# Patient Record
Sex: FEMALE | NOT HISPANIC OR LATINO
[De-identification: names, ages, dates, MRNs, and addresses within clinical notes are randomized per-mention and may not be internally consistent; named-entity substitution may affect disease eponyms.]

---

## 2017-05-08 ENCOUNTER — APPOINTMENT (OUTPATIENT)
Dept: HEMATOLOGY ONCOLOGY | Facility: CLINIC | Age: 52
End: 2017-05-08

## 2017-05-08 VITALS
HEART RATE: 60 BPM | TEMPERATURE: 98.7 F | BODY MASS INDEX: 27.47 KG/M2 | DIASTOLIC BLOOD PRESSURE: 60 MMHG | SYSTOLIC BLOOD PRESSURE: 100 MMHG | WEIGHT: 186 LBS

## 2017-05-08 RX ORDER — MULTIVIT-MIN/FOLIC/VIT K/LYCOP 400-300MCG
25 MCG TABLET ORAL
Refills: 0 | Status: ACTIVE | COMMUNITY

## 2017-07-20 ENCOUNTER — RESULT REVIEW (OUTPATIENT)
Age: 52
End: 2017-07-20

## 2017-10-31 ENCOUNTER — RECORD ABSTRACTING (OUTPATIENT)
Age: 52
End: 2017-10-31

## 2017-11-06 ENCOUNTER — APPOINTMENT (OUTPATIENT)
Dept: HEMATOLOGY ONCOLOGY | Facility: CLINIC | Age: 52
End: 2017-11-06
Payer: MEDICAID

## 2017-11-06 VITALS
OXYGEN SATURATION: 97 % | SYSTOLIC BLOOD PRESSURE: 104 MMHG | TEMPERATURE: 98.7 F | WEIGHT: 186 LBS | RESPIRATION RATE: 14 BRPM | HEART RATE: 82 BPM | BODY MASS INDEX: 27.47 KG/M2 | DIASTOLIC BLOOD PRESSURE: 70 MMHG

## 2017-11-06 DIAGNOSIS — Z12.4 ENCOUNTER FOR SCREENING FOR MALIGNANT NEOPLASM OF CERVIX: ICD-10-CM

## 2017-11-06 PROCEDURE — 99214 OFFICE O/P EST MOD 30 MIN: CPT

## 2018-05-10 ENCOUNTER — APPOINTMENT (OUTPATIENT)
Dept: HEMATOLOGY ONCOLOGY | Facility: CLINIC | Age: 53
End: 2018-05-10
Payer: MEDICAID

## 2018-05-10 VITALS
WEIGHT: 184 LBS | TEMPERATURE: 98.6 F | SYSTOLIC BLOOD PRESSURE: 106 MMHG | HEART RATE: 64 BPM | DIASTOLIC BLOOD PRESSURE: 66 MMHG | BODY MASS INDEX: 27.17 KG/M2

## 2018-05-10 PROCEDURE — 99214 OFFICE O/P EST MOD 30 MIN: CPT

## 2018-09-24 ENCOUNTER — APPOINTMENT (OUTPATIENT)
Dept: ORTHOPEDIC SURGERY | Facility: CLINIC | Age: 53
End: 2018-09-24
Payer: MEDICAID

## 2018-09-24 VITALS — BODY MASS INDEX: 25.62 KG/M2 | WEIGHT: 179 LBS | HEIGHT: 70 IN

## 2018-09-24 VITALS — DIASTOLIC BLOOD PRESSURE: 72 MMHG | SYSTOLIC BLOOD PRESSURE: 110 MMHG | HEART RATE: 65 BPM

## 2018-09-24 PROCEDURE — 73130 X-RAY EXAM OF HAND: CPT | Mod: LT

## 2018-09-24 PROCEDURE — 99204 OFFICE O/P NEW MOD 45 MIN: CPT | Mod: 57

## 2018-09-24 PROCEDURE — 26600 TREAT METACARPAL FRACTURE: CPT | Mod: F4

## 2018-09-24 RX ORDER — DIPHENHYDRAMINE HCL 25 MG
TABLET,DISINTEGRATING ORAL
Refills: 0 | Status: ACTIVE | COMMUNITY

## 2018-09-24 RX ORDER — CHOLESTEROL 100 %
POWDER (GRAM) MISCELLANEOUS
Refills: 0 | Status: ACTIVE | COMMUNITY

## 2018-10-07 PROBLEM — S62.327A CLOSED DISPLACED FRACTURE OF SHAFT OF FIFTH METACARPAL BONE OF LEFT HAND, INITIAL ENCOUNTER: Noted: 2018-09-24

## 2018-10-08 ENCOUNTER — APPOINTMENT (OUTPATIENT)
Dept: ORTHOPEDIC SURGERY | Facility: CLINIC | Age: 53
End: 2018-10-08
Payer: MEDICAID

## 2018-10-08 DIAGNOSIS — S62.327A DISPLACED FRACTURE OF SHAFT OF FIFTH METACARPAL BONE, LEFT HAND, INITIAL ENCOUNTER FOR CLOSED FRACTURE: ICD-10-CM

## 2018-10-08 PROCEDURE — 99024 POSTOP FOLLOW-UP VISIT: CPT

## 2018-10-08 PROCEDURE — 73130 X-RAY EXAM OF HAND: CPT | Mod: LT

## 2018-11-12 ENCOUNTER — APPOINTMENT (OUTPATIENT)
Dept: HEMATOLOGY ONCOLOGY | Facility: CLINIC | Age: 53
End: 2018-11-12
Payer: MEDICAID

## 2018-11-12 VITALS — DIASTOLIC BLOOD PRESSURE: 60 MMHG | SYSTOLIC BLOOD PRESSURE: 96 MMHG | HEART RATE: 72 BPM | TEMPERATURE: 98.5 F

## 2018-11-12 VITALS — WEIGHT: 180 LBS | BODY MASS INDEX: 25.83 KG/M2

## 2018-11-12 DIAGNOSIS — S62.327D DISPLACED FRACTURE OF SHAFT OF FIFTH METACARPAL BONE, LEFT HAND, SUBSEQUENT ENCOUNTER FOR FRACTURE WITH ROUTINE HEALING: ICD-10-CM

## 2018-11-12 PROCEDURE — 99214 OFFICE O/P EST MOD 30 MIN: CPT

## 2018-11-12 NOTE — ASSESSMENT
[FreeTextEntry1] : #1) history of breast cancer-clinically SRIRAM. Continue oncologic surveillance. Continues to see breast surgeon Dr. Marcelo biannually.\par #2) history of relative lymphocytosis-CBC reviewed with patient. Hematologically stable in this regard currently. Continue to monitor CBC with differential.\par Patient was given the opportunity to ask questions. Her questions have been answered at this time.\par

## 2018-11-12 NOTE — CONSULT LETTER
[Dear  ___] : Dear  [unfilled], [Courtesy Letter:] : I had the pleasure of seeing your patient, [unfilled], in my office today. [Please see my note below.] : Please see my note below. [Consult Closing:] : Thank you very much for allowing me to participate in the care of this patient.  If you have any questions, please do not hesitate to contact me. [Sincerely,] : Sincerely, [FreeTextEntry3] : Jada Herron M.D.

## 2018-11-12 NOTE — PHYSICAL EXAM
[Fully active, able to carry on all pre-disease performance without restriction] : Status 0 - Fully active, able to carry on all pre-disease performance without restriction [Normal] : affect appropriate [de-identified] : No dominant mass, nipple retraction, or discharge. [de-identified] : no vesicles

## 2018-11-12 NOTE — RESULTS/DATA
[FreeTextEntry1] : 9/2018-mammogram showed no mammographic evidence of malignancy. Breast ultrasound showed no sonographic evidence of malignancy.\par 11/2018-hemoglobin 13.4, hematocrit 40.4, WBC 7.3, with 33% neutrophils, 54% lymphocytes, platelet count 202,000.

## 2018-11-12 NOTE — HISTORY OF PRESENT ILLNESS
[Disease: _____________________] : Disease: [unfilled] [T: ___] : T[unfilled] [N: ___] : N[unfilled] [M: ___] : M[unfilled] [AJCC Stage: ____] : AJCC Stage: [unfilled] [de-identified] : History of right breast cancer 6/2006-Clinically Stage IIB (T2N1M0), ER negative/[place] negative/HER-2 sandro negative. Status post neoadjuvant TAC--> right breast conservation surgery-->RT.\par History of relative increase in lymphocytes , at least dating back to 5/2008.\par 10/2014 Peripheral blood flow cytometry showed no morphologic or immunophenotypic evidence of a lymphoproliferative disorder or presence of blasts.  Smear review showed increase in eosinophils, and borderline increase in lymphocytes, which showed diminished expression of CD 7. These findings could be seen in a T cell lymphoproliferative condition. This may also be seen in inflammatory conditions where the loss is temporary in nature.\par 3/2016-Peripheral blood T cell gene rearrangement study showed a clonal T-cell receptor gamma population detected.\par 3/2016- Abdominal ultrasound-unremarkable size of the liver and spleen.\par 6/2016-T-cell gene clonality study was positive for a clonal T-cell receptor beta population. 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 No GI complaints currently.\par No new breast pain or lumps. No recent infections or fevers. No lymph node complaints.  No c/o abdominal pain.\par

## 2019-05-10 ENCOUNTER — OUTPATIENT (OUTPATIENT)
Dept: OUTPATIENT SERVICES | Facility: HOSPITAL | Age: 54
LOS: 1 days | Discharge: HOME | End: 2019-05-10

## 2019-05-10 DIAGNOSIS — D64.9 ANEMIA, UNSPECIFIED: ICD-10-CM

## 2019-05-13 ENCOUNTER — APPOINTMENT (OUTPATIENT)
Dept: HEMATOLOGY ONCOLOGY | Facility: CLINIC | Age: 54
End: 2019-05-13
Payer: COMMERCIAL

## 2019-05-13 VITALS
TEMPERATURE: 98.3 F | OXYGEN SATURATION: 99 % | BODY MASS INDEX: 26.32 KG/M2 | DIASTOLIC BLOOD PRESSURE: 75 MMHG | SYSTOLIC BLOOD PRESSURE: 116 MMHG | HEART RATE: 68 BPM | WEIGHT: 183.42 LBS | RESPIRATION RATE: 16 BRPM

## 2019-05-13 PROCEDURE — 99214 OFFICE O/P EST MOD 30 MIN: CPT

## 2019-05-13 NOTE — PHYSICAL EXAM
[Fully active, able to carry on all pre-disease performance without restriction] : Status 0 - Fully active, able to carry on all pre-disease performance without restriction [Normal] : affect appropriate [de-identified] : no vesicles [de-identified] : No dominant mass, nipple retraction, or discharge.

## 2019-05-13 NOTE — HISTORY OF PRESENT ILLNESS
[T: ___] : T[unfilled] [Disease: _____________________] : Disease: [unfilled] [N: ___] : N[unfilled] [AJCC Stage: ____] : AJCC Stage: [unfilled] [M: ___] : M[unfilled] [de-identified] : History of right breast cancer 6/2006-Clinically Stage IIB (T2N1M0), ER negative/[place] negative/HER-2 sandro negative. Status post neoadjuvant TAC--> right breast conservation surgery-->RT.\par History of relative increase in lymphocytes , at least dating back to 5/2008.\par 10/2014 Peripheral blood flow cytometry showed no morphologic or immunophenotypic evidence of a lymphoproliferative disorder or presence of blasts.  Smear review showed increase in eosinophils, and borderline increase in lymphocytes, which showed diminished expression of CD 7. These findings could be seen in a T cell lymphoproliferative condition. This may also be seen in inflammatory conditions where the loss is temporary in nature.\par 3/2016-Peripheral blood T cell gene rearrangement study showed a clonal T-cell receptor gamma population detected.\par 3/2016- Abdominal ultrasound-unremarkable size of the liver and spleen.\par 6/2016-T-cell gene clonality study was positive for a clonal T-cell receptor beta population. 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 [de-identified] : Overall, feeling well, though does have some issues with sinuses/allergies. Plans to see urologist for recurrent microscopic hematuria. No c/o dysuria, fevers, pain.\par Had insufficient prep for last colonoscopy-has to reschedule it for 10/2019. No GI complaints currently.\par No new breast pain or lumps. No lymph node complaints. Sees breast surgeon every 6 months and has breast US done.\par

## 2019-05-13 NOTE — ASSESSMENT
[FreeTextEntry1] : #1) history of breast cancer-clinically SRIRAM. Continue oncologic surveillance. Patient will have her annual mammogram/breast ultrasound as planned (states she gets prescriptions for breast imaging from Dr. Marcelo).\par #2) history of relative lymphocytosis/T-cell clonal population of undetermined significance. Hematologically stable in this regard currently. Continue to follow CBC with differential. \par Patient's questions have been answered to her apparent satisfaction at this time.

## 2019-05-13 NOTE — CONSULT LETTER
[Dear  ___] : Dear  [unfilled], [Please see my note below.] : Please see my note below. [Courtesy Letter:] : I had the pleasure of seeing your patient, [unfilled], in my office today. [Consult Closing:] : Thank you very much for allowing me to participate in the care of this patient.  If you have any questions, please do not hesitate to contact me. [Sincerely,] : Sincerely, [DrWesley  ___] : Dr. GUAN [FreeTextEntry3] : Jada Herron M.D.

## 2019-05-13 NOTE — RESULTS/DATA
[FreeTextEntry1] : Hemoglobin 13.4, hematocrit 39.2, WBC 8.6, with 32% neutrophils, 53% lymphocytes, 9% eosinophils, platelet count 237,000.
Yes

## 2019-06-11 ENCOUNTER — APPOINTMENT (OUTPATIENT)
Dept: UROLOGY | Facility: CLINIC | Age: 54
End: 2019-06-11
Payer: COMMERCIAL

## 2019-06-11 VITALS
SYSTOLIC BLOOD PRESSURE: 119 MMHG | RESPIRATION RATE: 16 BRPM | BODY MASS INDEX: 26.48 KG/M2 | TEMPERATURE: 98.4 F | HEART RATE: 62 BPM | DIASTOLIC BLOOD PRESSURE: 71 MMHG | WEIGHT: 185 LBS | HEIGHT: 70 IN

## 2019-06-11 DIAGNOSIS — Z78.9 OTHER SPECIFIED HEALTH STATUS: ICD-10-CM

## 2019-06-11 DIAGNOSIS — R73.03 PREDIABETES.: ICD-10-CM

## 2019-06-11 DIAGNOSIS — Z85.3 PERSONAL HISTORY OF MALIGNANT NEOPLASM OF BREAST: ICD-10-CM

## 2019-06-11 DIAGNOSIS — Z80.42 FAMILY HISTORY OF MALIGNANT NEOPLASM OF PROSTATE: ICD-10-CM

## 2019-06-11 DIAGNOSIS — Z86.39 PERSONAL HISTORY OF OTHER ENDOCRINE, NUTRITIONAL AND METABOLIC DISEASE: ICD-10-CM

## 2019-06-11 DIAGNOSIS — Z80.3 FAMILY HISTORY OF MALIGNANT NEOPLASM OF BREAST: ICD-10-CM

## 2019-06-11 DIAGNOSIS — R31.29 OTHER MICROSCOPIC HEMATURIA: ICD-10-CM

## 2019-06-11 PROCEDURE — 99203 OFFICE O/P NEW LOW 30 MIN: CPT

## 2019-06-11 RX ORDER — CALCIUM CITRATE/VITAMIN D3 315MG-6.25
TABLET ORAL
Refills: 0 | Status: ACTIVE | COMMUNITY

## 2019-06-11 RX ORDER — SIMVASTATIN 80 MG/1
TABLET, FILM COATED ORAL
Refills: 0 | Status: ACTIVE | COMMUNITY

## 2019-06-11 NOTE — REVIEW OF SYSTEMS
[Eyesight Problems] : eyesight problems [Date of last menstrual period ____] : date of last menstrual period: [unfilled] [Told you have blood in urine on a urine test] : told blood was present in a urine test [Negative] : Endocrine

## 2019-06-11 NOTE — PHYSICAL EXAM
[General Appearance - Well Developed] : well developed [General Appearance - Well Nourished] : well nourished [General Appearance - In No Acute Distress] : no acute distress [Well Groomed] : well groomed [Normal Appearance] : normal appearance [Edema] : no peripheral edema [Respiration, Rhythm And Depth] : normal respiratory rhythm and effort [] : no respiratory distress [Exaggerated Use Of Accessory Muscles For Inspiration] : no accessory muscle use [Costovertebral Angle Tenderness] : no ~M costovertebral angle tenderness [Abdomen Tenderness] : non-tender [Abdomen Soft] : soft [Urinary Bladder Findings] : the bladder was normal on palpation

## 2019-06-12 ENCOUNTER — OTHER (OUTPATIENT)
Age: 54
End: 2019-06-12

## 2019-06-12 LAB
APPEARANCE: CLEAR
BACTERIA: NEGATIVE
BILIRUBIN URINE: NEGATIVE
BLOOD URINE: NEGATIVE
COLOR: NORMAL
GLUCOSE QUALITATIVE U: NEGATIVE
HYALINE CASTS: 0 /LPF
KETONES URINE: NEGATIVE
LEUKOCYTE ESTERASE URINE: NEGATIVE
MICROSCOPIC-UA: NORMAL
NITRITE URINE: NEGATIVE
PH URINE: 7.5
PROTEIN URINE: NEGATIVE
RED BLOOD CELLS URINE: 4 /HPF
SPECIFIC GRAVITY URINE: 1.01
SQUAMOUS EPITHELIAL CELLS: 0 /HPF
URINE CYTOLOGY: NORMAL
UROBILINOGEN URINE: NORMAL
WHITE BLOOD CELLS URINE: 1 /HPF

## 2019-06-13 ENCOUNTER — FORM ENCOUNTER (OUTPATIENT)
Age: 54
End: 2019-06-13

## 2019-06-14 ENCOUNTER — APPOINTMENT (OUTPATIENT)
Dept: CT IMAGING | Facility: CLINIC | Age: 54
End: 2019-06-14
Payer: COMMERCIAL

## 2019-06-14 ENCOUNTER — OUTPATIENT (OUTPATIENT)
Dept: OUTPATIENT SERVICES | Facility: HOSPITAL | Age: 54
LOS: 1 days | End: 2019-06-14
Payer: COMMERCIAL

## 2019-06-14 DIAGNOSIS — R31.29 OTHER MICROSCOPIC HEMATURIA: ICD-10-CM

## 2019-06-14 DIAGNOSIS — Z00.8 ENCOUNTER FOR OTHER GENERAL EXAMINATION: ICD-10-CM

## 2019-06-14 PROCEDURE — 74178 CT ABD&PLV WO CNTR FLWD CNTR: CPT | Mod: 26

## 2019-06-14 PROCEDURE — 74178 CT ABD&PLV WO CNTR FLWD CNTR: CPT

## 2019-06-26 NOTE — HISTORY OF PRESENT ILLNESS
[FreeTextEntry1] : Long standing hx of microscopic hematuria.\par Has not had formal workup in the past.  No associated urinary symptoms.\par No flank pain.  No abdominal pain.  No dysuria, gross hematuria.\par No prior  surgery or h/o kidney stones.\par

## 2019-11-15 ENCOUNTER — OUTPATIENT (OUTPATIENT)
Dept: OUTPATIENT SERVICES | Facility: HOSPITAL | Age: 54
LOS: 1 days | Discharge: HOME | End: 2019-11-15

## 2019-11-15 DIAGNOSIS — D64.9 ANEMIA, UNSPECIFIED: ICD-10-CM

## 2019-11-18 ENCOUNTER — RESULT REVIEW (OUTPATIENT)
Age: 54
End: 2019-11-18

## 2019-11-18 ENCOUNTER — APPOINTMENT (OUTPATIENT)
Dept: HEMATOLOGY ONCOLOGY | Facility: CLINIC | Age: 54
End: 2019-11-18
Payer: COMMERCIAL

## 2019-11-18 VITALS
DIASTOLIC BLOOD PRESSURE: 68 MMHG | OXYGEN SATURATION: 100 % | HEART RATE: 67 BPM | HEIGHT: 68.5 IN | TEMPERATURE: 98.2 F | BODY MASS INDEX: 26.98 KG/M2 | WEIGHT: 180.1 LBS | SYSTOLIC BLOOD PRESSURE: 108 MMHG | RESPIRATION RATE: 16 BRPM

## 2019-11-18 LAB
BASOPHILS # BLD AUTO: 0.1 K/UL — SIGNIFICANT CHANGE UP (ref 0–0.2)
BASOPHILS NFR BLD AUTO: 1 % — SIGNIFICANT CHANGE UP (ref 0–2)
EOSINOPHIL # BLD AUTO: 1.1 K/UL — HIGH (ref 0–0.5)
EOSINOPHIL NFR BLD AUTO: 10.2 % — HIGH (ref 0–6)
HCT VFR BLD CALC: 39.1 % — SIGNIFICANT CHANGE UP (ref 34.5–45)
HGB BLD-MCNC: 13 G/DL — SIGNIFICANT CHANGE UP (ref 11.5–15.5)
LYMPHOCYTES # BLD AUTO: 4.8 K/UL — HIGH (ref 1–3.3)
LYMPHOCYTES # BLD AUTO: 43.6 % — SIGNIFICANT CHANGE UP (ref 13–44)
MCHC RBC-ENTMCNC: 27.7 PG — SIGNIFICANT CHANGE UP (ref 27–34)
MCHC RBC-ENTMCNC: 33.2 G/DL — SIGNIFICANT CHANGE UP (ref 32–36)
MCV RBC AUTO: 83.4 FL — SIGNIFICANT CHANGE UP (ref 80–100)
MONOCYTES # BLD AUTO: 0.6 K/UL — SIGNIFICANT CHANGE UP (ref 0–0.9)
MONOCYTES NFR BLD AUTO: 5.6 % — SIGNIFICANT CHANGE UP (ref 2–14)
NEUTROPHILS # BLD AUTO: 4.3 K/UL — SIGNIFICANT CHANGE UP (ref 1.8–7.4)
NEUTROPHILS NFR BLD AUTO: 39.6 % — LOW (ref 43–77)
PLATELET # BLD AUTO: 192 K/UL — SIGNIFICANT CHANGE UP (ref 150–400)
RBC # BLD: 4.68 M/UL — SIGNIFICANT CHANGE UP (ref 3.8–5.2)
RBC # FLD: 12.5 % — SIGNIFICANT CHANGE UP (ref 10.3–14.5)
WBC # BLD: 10.9 K/UL — HIGH (ref 3.8–10.5)
WBC # FLD AUTO: 10.9 K/UL — HIGH (ref 3.8–10.5)

## 2019-11-18 PROCEDURE — 99214 OFFICE O/P EST MOD 30 MIN: CPT

## 2019-11-18 NOTE — HISTORY OF PRESENT ILLNESS
[Disease: _____________________] : Disease: [unfilled] [T: ___] : T[unfilled] [N: ___] : N[unfilled] [AJCC Stage: ____] : AJCC Stage: [unfilled] [M: ___] : M[unfilled] [de-identified] : History of right breast cancer 6/2006-Clinically Stage IIB (T2N1M0), ER negative/[place] negative/HER-2 sandro negative. Status post neoadjuvant TAC--> right breast conservation surgery-->RT.\par History of relative increase in lymphocytes , at least dating back to 5/2008.\par 10/2014 Peripheral blood flow cytometry showed no morphologic or immunophenotypic evidence of a lymphoproliferative disorder or presence of blasts.  Smear review showed increase in eosinophils, and borderline increase in lymphocytes, which showed diminished expression of CD 7. These findings could be seen in a T cell lymphoproliferative condition. This may also be seen in inflammatory conditions where the loss is temporary in nature.\par 3/2016-Peripheral blood T cell gene rearrangement study showed a clonal T-cell receptor gamma population detected.\par 3/2016- Abdominal ultrasound-unremarkable size of the liver and spleen.\par 6/2016-T-cell gene clonality study was positive for a clonal T-cell receptor beta population. 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 [de-identified] : Feeling well without new complaints.\par Has routine f/u with Dr. Marcelo planned in 12/2019, as well as PCP in 1/2020.\par Needs to schedule colonoscopy-plans to see GI MD (will be her PCP also) in 1/2020.\par No new breast pain or lumps. No lymph node complaints. \par

## 2019-11-18 NOTE — ASSESSMENT
[FreeTextEntry1] : #1) history of breast cancer-clinically SRIRAM. Continue oncologic surveillance. 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 F/U CBC with diff. to be done.\par Patient was given the opportunity to ask questions. Her questions have been answered to her apparent satisfaction at this time.

## 2019-11-18 NOTE — PHYSICAL EXAM
[Fully active, able to carry on all pre-disease performance without restriction] : Status 0 - Fully active, able to carry on all pre-disease performance without restriction [Normal] : affect appropriate [de-identified] : No dominant mass, nipple retraction, or discharge. [de-identified] : no vesicles

## 2019-11-18 NOTE — CONSULT LETTER
[Dear  ___] : Dear  [unfilled], [Courtesy Letter:] : I had the pleasure of seeing your patient, [unfilled], in my office today. [Please see my note below.] : Please see my note below. [Consult Closing:] : Thank you very much for allowing me to participate in the care of this patient.  If you have any questions, please do not hesitate to contact me. [Sincerely,] : Sincerely, [DrWesley  ___] : Dr. GUAN [FreeTextEntry3] : Jada Herron M.D.

## 2020-05-13 ENCOUNTER — OUTPATIENT (OUTPATIENT)
Dept: OUTPATIENT SERVICES | Facility: HOSPITAL | Age: 55
LOS: 1 days | Discharge: HOME | End: 2020-05-13

## 2020-05-13 DIAGNOSIS — D64.9 ANEMIA, UNSPECIFIED: ICD-10-CM

## 2020-05-18 ENCOUNTER — APPOINTMENT (OUTPATIENT)
Dept: HEMATOLOGY ONCOLOGY | Facility: CLINIC | Age: 55
End: 2020-05-18
Payer: MEDICAID

## 2020-05-18 PROCEDURE — 99442: CPT

## 2020-05-18 NOTE — HISTORY OF PRESENT ILLNESS
[Verbal consent obtained from patient] : the patient, [unfilled] [Disease: _____________________] : Disease: [unfilled] [T: ___] : T[unfilled] [N: ___] : N[unfilled] [M: ___] : M[unfilled] [AJCC Stage: ____] : AJCC Stage: [unfilled] [de-identified] : History of right breast cancer 6/2006-Clinically Stage IIB (T2N1M0), ER negative/[place] negative/HER-2 sandro negative. Status post neoadjuvant TAC--> right breast conservation surgery-->RT.\par History of relative increase in lymphocytes , at least dating back to 5/2008.\par 10/2014 Peripheral blood flow cytometry showed no morphologic or immunophenotypic evidence of a lymphoproliferative disorder or presence of blasts.  Smear review showed increase in eosinophils, and borderline increase in lymphocytes, which showed diminished expression of CD 7. These findings could be seen in a T cell lymphoproliferative condition. This may also be seen in inflammatory conditions where the loss is temporary in nature.\par 3/2016-Peripheral blood T cell gene rearrangement study showed a clonal T-cell receptor gamma population detected.\par 3/2016- Abdominal ultrasound-unremarkable size of the liver and spleen.\par 6/2016-T-cell gene clonality study was positive for a clonal T-cell receptor beta population. 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 [de-identified] : Telephone visit due to COVID-19 pandemic.\dianelys Still seeing Dr. Marcelo every 6 months-has f/u planned.\dianelys Has not yet updated her colonoscopy-plans to see GI MD as soon as she is able.\dianelys No new breast pain or lumps. No lymph node complaints. 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 Garner.\dianelys

## 2020-05-18 NOTE — CONSULT LETTER
[Courtesy Letter:] : I had the pleasure of seeing your patient, [unfilled], in my office today. [Please see my note below.] : Please see my note below. [Sincerely,] : Sincerely, [DrWesley  ___] : Dr. GUAN [Dear  ___] : Dear  [unfilled], [FreeTextEntry3] : MYRON Middleton.

## 2020-05-18 NOTE — ASSESSMENT
[FreeTextEntry1] : #1) history of breast cancer-clinically stable. Continue oncologic surveillance. 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 Hematologic surveillance.  F/U CBC with diff. to be done.\par Patient was given the opportunity to ask questions. Her questions have been answered to her apparent satisfaction at this time.

## 2020-08-31 ENCOUNTER — OUTPATIENT (OUTPATIENT)
Dept: OUTPATIENT SERVICES | Facility: HOSPITAL | Age: 55
LOS: 1 days | Discharge: HOME | End: 2020-08-31

## 2020-08-31 DIAGNOSIS — D64.9 ANEMIA, UNSPECIFIED: ICD-10-CM

## 2020-09-08 ENCOUNTER — APPOINTMENT (OUTPATIENT)
Dept: HEMATOLOGY ONCOLOGY | Facility: CLINIC | Age: 55
End: 2020-09-08
Payer: MEDICAID

## 2020-09-08 DIAGNOSIS — Z98.890 OTHER SPECIFIED POSTPROCEDURAL STATES: ICD-10-CM

## 2020-09-08 PROCEDURE — 99442: CPT

## 2020-09-08 RX ORDER — DOXYCYCLINE HYCLATE 100 MG/1
100 TABLET ORAL
Qty: 42 | Refills: 0 | Status: DISCONTINUED | COMMUNITY
Start: 2020-06-29

## 2020-09-08 RX ORDER — POLYETHYLENE GLYCOL-3350 AND ELECTROLYTES 236; 6.74; 5.86; 2.97; 22.74 G/274.31G; G/274.31G; G/274.31G; G/274.31G; G/274.31G
236 POWDER, FOR SOLUTION ORAL
Qty: 4000 | Refills: 0 | Status: ACTIVE | COMMUNITY
Start: 2020-08-05

## 2020-09-08 RX ORDER — FLUCONAZOLE 150 MG/1
150 TABLET ORAL
Qty: 1 | Refills: 0 | Status: ACTIVE | COMMUNITY
Start: 2020-07-02

## 2020-09-08 RX ORDER — ICOSAPENT ETHYL 1000 MG/1
1 CAPSULE ORAL
Qty: 120 | Refills: 0 | Status: ACTIVE | COMMUNITY
Start: 2020-07-18

## 2020-09-08 NOTE — REASON FOR VISIT
[Home] : at home, [unfilled] , at the time of the visit. [Medical Office: ([facility])___] : at the medical office located in  [Verbal consent obtained from patient] : the patient, [unfilled] [Follow-Up Visit] : a follow-up

## 2020-09-08 NOTE — RESULTS/DATA
[FreeTextEntry1] : Hemoglobin 13.2, hematocrit 41.1, WBC 8.44 with 29% neutrophils, 56% lymphocytes, 8.6% eosinophils, platelet count 210,000.

## 2020-09-08 NOTE — HISTORY OF PRESENT ILLNESS
[Disease: _____________________] : Disease: [unfilled] [T: ___] : T[unfilled] [N: ___] : N[unfilled] [AJCC Stage: ____] : AJCC Stage: [unfilled] [M: ___] : M[unfilled] [de-identified] : Infiltrating duct carcinoma [de-identified] : BRCA 1 sequencing (5 site rearrangement panel) no mutation detected.\par BRCA 2 sequencing-no mutation detected. [de-identified] : History of right breast cancer 6/2006-Clinically Stage IIB (T2N1M0), ER negative/[place] negative/HER-2 sandro negative. Status post neoadjuvant TAC--> right breast conservation surgery-->RT.\par History of relative increase in lymphocytes , at least dating back to 5/2008.\par 10/2014 Peripheral blood flow cytometry showed no morphologic or immunophenotypic evidence of a lymphoproliferative disorder or presence of blasts.  Smear review showed increase in eosinophils, and borderline increase in lymphocytes, which showed diminished expression of CD 7. These findings could be seen in a T cell lymphoproliferative condition. This may also be seen in inflammatory conditions where the loss is temporary in nature.\par 3/2016-Peripheral blood T cell gene rearrangement study showed a clonal T-cell receptor gamma population detected.\par 3/2016- Abdominal ultrasound-unremarkable size of the liver and spleen.\par 6/2016-T-cell gene clonality study was positive for a clonal T-cell receptor beta population. [de-identified] : Telephone visit due to COVID-19 pandemic.\par S/P Doxycycline treatment for Lyme disease.\par Still seeing Dr. Marcelo every 6 months-has f/u planned.\par S/P colonoscopy 8/2020-had 2 benign polyps removed.\par No new breast pain or lumps. Mammogram/breast US planned per surgeon. \par No lymph node complaints. No fevers, cough or SOB.\par Is working as private HHA.\par PCP now in [facility]-Dr. Garner.\par

## 2020-09-08 NOTE — ASSESSMENT
[FreeTextEntry1] : #1) history of breast cancer-clinically stable. Continue oncologic surveillance. 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 Recent CBC reviewed with patient. ANC WNL. Hematologic surveillance. \par Patient was given the opportunity to ask questions. Her questions have been answered to her apparent satisfaction at this time.

## 2020-09-17 ENCOUNTER — APPOINTMENT (OUTPATIENT)
Dept: HEMATOLOGY ONCOLOGY | Facility: CLINIC | Age: 55
End: 2020-09-17

## 2021-03-07 ENCOUNTER — OUTPATIENT (OUTPATIENT)
Dept: OUTPATIENT SERVICES | Facility: HOSPITAL | Age: 56
LOS: 1 days | Discharge: HOME | End: 2021-03-07
Payer: COMMERCIAL

## 2021-03-07 DIAGNOSIS — D64.9 ANEMIA, UNSPECIFIED: ICD-10-CM

## 2021-03-11 ENCOUNTER — APPOINTMENT (OUTPATIENT)
Dept: HEMATOLOGY ONCOLOGY | Facility: CLINIC | Age: 56
End: 2021-03-11
Payer: MEDICAID

## 2021-03-11 VITALS
OXYGEN SATURATION: 99 % | TEMPERATURE: 96.6 F | DIASTOLIC BLOOD PRESSURE: 69 MMHG | HEIGHT: 68.46 IN | WEIGHT: 178.57 LBS | SYSTOLIC BLOOD PRESSURE: 105 MMHG | HEART RATE: 72 BPM | BODY MASS INDEX: 26.75 KG/M2 | RESPIRATION RATE: 17 BRPM

## 2021-03-11 DIAGNOSIS — Z86.19 PERSONAL HISTORY OF OTHER INFECTIOUS AND PARASITIC DISEASES: ICD-10-CM

## 2021-03-11 PROCEDURE — 99072 ADDL SUPL MATRL&STAF TM PHE: CPT

## 2021-03-11 PROCEDURE — 99215 OFFICE O/P EST HI 40 MIN: CPT

## 2021-03-11 NOTE — CONSULT LETTER
[Dear  ___] : Dear  [unfilled], [Courtesy Letter:] : I had the pleasure of seeing your patient, [unfilled], in my office today. [Please see my note below.] : Please see my note below. [Consult Closing:] : Thank you very much for allowing me to participate in the care of this patient.  If you have any questions, please do not hesitate to contact me. [Sincerely,] : Sincerely, [DrWesley  ___] : Dr. GUAN [FreeTextEntry3] : Jada Herron MD

## 2021-03-11 NOTE — ASSESSMENT
[FreeTextEntry1] : #1) history of right breast cancer 2006, for which patient has been on expectant surveillance and clinically SRIRAM.  Now status post left breast lumpectomy, at which time ADH was found.\par Discussed with patient diagnosis of ADH and potential management options.  Discussed consideration of preventative therapy-alternatives reviewed with respective pros and cons (for example tamoxifen, raloxifene, aromatase inhibitor).  Patient expressed understanding but refuses any medication at this time.\par She has had an extended genetic testing panel ordered by Dr. Marcelo, with results pending.\par She will return to our office following her next surgical follow-up in 6/2021.  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 Recent CBC reviewed with patient. ANC WNL. Hematologic surveillance. \par \par Patient was given the opportunity to ask questions. Her questions have been answered to her apparent satisfaction at this time.

## 2021-03-11 NOTE — RESULTS/DATA
[FreeTextEntry1] : 2/8/2021–hemoglobin 13.5, hematocrit 42.3, WBC 7.53 with 29% neutrophils, 57% lymphocytes, platelet count 243,000.\par 2/2020 mammogram showed a newly identified indeterminate 5 mm oval mass in the left breast as well as an additional 5 mm oval mass.  Biopsy of the left breast revealed ADH.  Patient subsequently underwent a left breast wide excision with pathology revealing atypical ductal hyperplasia in an intraductal papilloma adjacent to the biopsy site.  Margins negative for ADH.

## 2021-03-11 NOTE — HISTORY OF PRESENT ILLNESS
[Disease: _____________________] : Disease: [unfilled] [T: ___] : T[unfilled] [N: ___] : N[unfilled] [M: ___] : M[unfilled] [AJCC Stage: ____] : AJCC Stage: [unfilled] [de-identified] : History of right breast cancer 6/2006-Clinically Stage IIB (T2N1M0), ER negative/UT negative/HER-2 sandro negative. Status post neoadjuvant TAC--> right breast conservation surgery-->RT.\par \par History of relative increase in lymphocytes , at least dating back to 5/2008.\par 10/2014 Peripheral blood flow cytometry showed no morphologic or immunophenotypic evidence of a lymphoproliferative disorder or presence of blasts.  Smear review showed increase in eosinophils, and borderline increase in lymphocytes, which showed diminished expression of CD 7. These findings could be seen in a T cell lymphoproliferative condition. This may also be seen in inflammatory conditions where the loss is temporary in nature.\par 3/2016-Peripheral blood T cell gene rearrangement study showed a clonal T-cell receptor gamma population detected.\par 3/2016- Abdominal ultrasound-unremarkable size of the liver and spleen.\par 6/2016-T-cell gene clonality study was positive for a clonal T-cell receptor beta population.\par \par 2/2021-Left breast ADH s/p lumpectomy. patient declining chemopreventative therapy. 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 [de-identified] : 2 weeks ago, had left breast lumpectomy for ADH.\par No lymph node complaints. No fevers, cough or SOB.\par Is working as private HHA still.\par PCP now in [facility]-Dr. Garner.\par BRCA panel ordered by surgeon which is pending-seeing surgeon again in June.

## 2021-03-11 NOTE — PHYSICAL EXAM
[Normal] : affect appropriate [Fully active, able to carry on all pre-disease performance without restriction] : Status 0 - Fully active, able to carry on all pre-disease performance without restriction [de-identified] : no dominant mass, nipple retraction or discharge

## 2021-03-19 ENCOUNTER — RESULT REVIEW (OUTPATIENT)
Age: 56
End: 2021-03-19

## 2021-03-19 PROCEDURE — 88321 CONSLTJ&REPRT SLD PREP ELSWR: CPT

## 2021-03-23 LAB — SURGICAL PATHOLOGY STUDY: SIGNIFICANT CHANGE UP

## 2021-03-25 ENCOUNTER — NON-APPOINTMENT (OUTPATIENT)
Age: 56
End: 2021-03-25

## 2021-05-13 ENCOUNTER — NON-APPOINTMENT (OUTPATIENT)
Age: 56
End: 2021-05-13

## 2021-06-15 ENCOUNTER — OUTPATIENT (OUTPATIENT)
Dept: OUTPATIENT SERVICES | Facility: HOSPITAL | Age: 56
LOS: 1 days | Discharge: HOME | End: 2021-06-15

## 2021-06-15 DIAGNOSIS — D64.9 ANEMIA, UNSPECIFIED: ICD-10-CM

## 2021-06-16 ENCOUNTER — APPOINTMENT (OUTPATIENT)
Dept: HEMATOLOGY ONCOLOGY | Facility: CLINIC | Age: 56
End: 2021-06-16
Payer: MEDICAID

## 2021-06-16 VITALS
BODY MASS INDEX: 28.37 KG/M2 | TEMPERATURE: 97.3 F | SYSTOLIC BLOOD PRESSURE: 105 MMHG | RESPIRATION RATE: 17 BRPM | DIASTOLIC BLOOD PRESSURE: 68 MMHG | OXYGEN SATURATION: 98 % | WEIGHT: 180.78 LBS | HEART RATE: 74 BPM | HEIGHT: 67 IN

## 2021-06-16 PROCEDURE — 99214 OFFICE O/P EST MOD 30 MIN: CPT

## 2021-06-16 NOTE — CONSULT LETTER
[Dear  ___] : Dear  [unfilled], [Courtesy Letter:] : I had the pleasure of seeing your patient, [unfilled], in my office today. [Please see my note below.] : Please see my note below. [Consult Closing:] : Thank you very much for allowing me to participate in the care of this patient.  If you have any questions, please do not hesitate to contact me. [Sincerely,] : Sincerely, [FreeTextEntry3] : Jada Herron MD

## 2021-06-16 NOTE — ASSESSMENT
[FreeTextEntry1] : Path. consult reviewed.\par #1) history of right breast cancer-clinically SRIRAM. Continue oncologic surveillance. \par \par #2) ADH left breast-discussed with patient potential benefits/risks of chemopreventative medications in this situation–patient refuses any medication at this time.  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  Reviewed diagnosis with potential complications with patient.  Explained that some bone marrow disorders/LPD's may evolve over time. Discussed potential benefits/side effects of bone marrow evaluation in this situation.  In light of chronicity of her relative lymphocytosis and lack of related symptoms, patient wishes for continued lab work surveillance for now.  Should her hematologic/clinical scenario change/worsen, she would reconsider BMB. Will re-check PB flow cytometry with next lab work.\par Telephone call to patient's new primary care physician Dr. Aceves–I have left a message on his voicemail to call me so that we may discuss patient's case.\par  \par Patient was given the opportunity to ask questions. Her questions have been answered to her apparent satisfaction at this time.

## 2021-06-16 NOTE — HISTORY OF PRESENT ILLNESS
[Disease: _____________________] : Disease: [unfilled] [T: ___] : T[unfilled] [N: ___] : N[unfilled] [M: ___] : M[unfilled] [AJCC Stage: ____] : AJCC Stage: [unfilled] [de-identified] : History of right breast cancer 6/2006-Clinically Stage IIB (T2N1M0), ER negative/[place] negative/HER-2 sandro negative. Status post neoadjuvant TAC--> right breast conservation surgery-->RT.\par \par History of relative increase in lymphocytes , at least dating back to 5/2008.\par 10/2014 Peripheral blood flow cytometry showed no morphologic or immunophenotypic evidence of a lymphoproliferative disorder or presence of blasts.  Smear review showed increase in eosinophils, and borderline increase in lymphocytes, which showed diminished expression of CD 7. These findings could be seen in a T cell lymphoproliferative condition. This may also be seen in inflammatory conditions where the loss is temporary in nature.\par 3/2016-Peripheral blood T cell gene rearrangement study showed a clonal T-cell receptor gamma population detected.\par 3/2016- Abdominal ultrasound-unremarkable size of the liver and spleen.\par 6/2016-T-cell gene clonality study was positive for a clonal T-cell receptor beta population.\par \par 2/2021-Left breast ADH s/p lumpectomy. Patient declining chemopreventative therapy. 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 [de-identified] : Saw Dr. Marcelo yesterday-had BRCA/genetic testing blood drawn then.\par No lymph node complaints. No fevers, cough or SOB.\par Is working as private HHA still.\par PPD+ (h/o). PCP has now decided to give 6 months of treatment. No pulmonary complaints at this time.

## 2021-06-16 NOTE — PHYSICAL EXAM
[Fully active, able to carry on all pre-disease performance without restriction] : Status 0 - Fully active, able to carry on all pre-disease performance without restriction [Normal] : affect appropriate [de-identified] : no dominant mass, nipple retraction or discharge

## 2021-09-10 ENCOUNTER — OUTPATIENT (OUTPATIENT)
Dept: OUTPATIENT SERVICES | Facility: HOSPITAL | Age: 56
LOS: 1 days | Discharge: HOME | End: 2021-09-10

## 2021-09-10 DIAGNOSIS — D64.9 ANEMIA, UNSPECIFIED: ICD-10-CM

## 2021-09-13 ENCOUNTER — APPOINTMENT (OUTPATIENT)
Dept: HEMATOLOGY ONCOLOGY | Facility: CLINIC | Age: 56
End: 2021-09-13
Payer: MEDICAID

## 2021-09-13 VITALS
HEIGHT: 67.01 IN | OXYGEN SATURATION: 98 % | BODY MASS INDEX: 29.41 KG/M2 | RESPIRATION RATE: 17 BRPM | HEART RATE: 75 BPM | TEMPERATURE: 97.7 F | DIASTOLIC BLOOD PRESSURE: 75 MMHG | SYSTOLIC BLOOD PRESSURE: 118 MMHG | WEIGHT: 187.39 LBS

## 2021-09-13 PROCEDURE — 99214 OFFICE O/P EST MOD 30 MIN: CPT

## 2021-09-13 RX ORDER — FOLIC ACID 1 MG/1
1 TABLET ORAL
Qty: 30 | Refills: 0 | Status: ACTIVE | COMMUNITY
Start: 2021-06-24

## 2021-09-13 RX ORDER — ICOSAPENT ETHYL 1000 MG/1
1 CAPSULE ORAL
Refills: 0 | Status: ACTIVE | COMMUNITY

## 2021-09-13 RX ORDER — ISONIAZID 300 MG/1
300 TABLET ORAL
Qty: 30 | Refills: 0 | Status: ACTIVE | COMMUNITY
Start: 2021-06-24

## 2021-09-13 RX ORDER — FERROUS GLUCONATE 324(38)MG
324 (38 FE) TABLET ORAL
Qty: 30 | Refills: 0 | Status: DISCONTINUED | COMMUNITY
Start: 2020-06-29 | End: 2021-09-13

## 2021-09-13 RX ORDER — FLUTICASONE PROPIONATE AND SALMETEROL 100; 50 UG/1; UG/1
100-50 POWDER RESPIRATORY (INHALATION)
Qty: 60 | Refills: 0 | Status: ACTIVE | COMMUNITY
Start: 2021-06-24

## 2021-09-13 RX ORDER — PYRIDOXINE HCL (VITAMIN B6) 25 MG
25 TABLET ORAL
Qty: 30 | Refills: 0 | Status: ACTIVE | COMMUNITY
Start: 2021-06-24

## 2021-09-13 RX ORDER — ERGOCALCIFEROL 1.25 MG/1
1.25 MG CAPSULE, LIQUID FILLED ORAL
Qty: 8 | Refills: 0 | Status: ACTIVE | COMMUNITY
Start: 2021-08-30

## 2021-09-13 NOTE — PHYSICAL EXAM
[Fully active, able to carry on all pre-disease performance without restriction] : Status 0 - Fully active, able to carry on all pre-disease performance without restriction [Normal] : affect appropriate [de-identified] : no dominant mass, nipple retraction or discharge

## 2021-09-13 NOTE — ASSESSMENT
[FreeTextEntry1] : Lab work reviewed.\par #1) history of right breast cancer-clinically stable. Continue oncologic surveillance.  ADH left breast-patent is agreeable to breast cancer surveillance. \par Patient given prescriptions for her next mammogram/breast ultrasound at her request.  She continues in follow-up with her breast surgeon as well.\par \par #2) history of relative lymphocytosis/T-cell clonal population of undetermined significance.  8/2021 follow-up peripheral blood cytometry with no abnormal lymphoid or myeloid population with lymphocytosis noted.  Hematologic surveillance for now.  Should her hematologic/clinical scenario change/worsen, to reconsider BMB. \par \par #3) h/o elevated LFT's-patient stated she is following up with PCP for this.\par  \par Patient was given the opportunity to ask questions. Her questions have been answered to her apparent satisfaction at this time.

## 2021-09-13 NOTE — HISTORY OF PRESENT ILLNESS
[Disease: _____________________] : Disease: [unfilled] [T: ___] : T[unfilled] [N: ___] : N[unfilled] [M: ___] : M[unfilled] [AJCC Stage: ____] : AJCC Stage: [unfilled] [de-identified] : History of right breast cancer 6/2006-Clinically Stage IIB (T2N1M0), ER negative/[place] negative/HER-2 sandro negative. Status post neoadjuvant TAC--> right breast conservation surgery-->RT.\par \par History of relative increase in lymphocytes , at least dating back to 5/2008.\par 10/2014 Peripheral blood flow cytometry showed no morphologic or immunophenotypic evidence of a lymphoproliferative disorder or presence of blasts.  Smear review showed increase in eosinophils, and borderline increase in lymphocytes, which showed diminished expression of CD 7. These findings could be seen in a T cell lymphoproliferative condition. This may also be seen in inflammatory conditions where the loss is temporary in nature.\par 3/2016-Peripheral blood T cell gene rearrangement study showed a clonal T-cell receptor gamma population detected.\par 3/2016- Abdominal ultrasound-unremarkable size of the liver and spleen.\par 6/2016-T-cell gene clonality study was positive for a clonal T-cell receptor beta population.\par \par 2/2021-Left breast ADH s/p lumpectomy. Patient declining chemopreventative therapy. 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 [de-identified] : Generally feeling well.\par Had BRCA/genetic testing-told negative by surgeon.\par No lymph node complaints. No fevers, cough or SOB.\par Is working as private HHA still.\par No pulmonary complaints at this time.\par \par Had COVID vaccines (Pfizer).

## 2021-09-13 NOTE — RESULTS/DATA
[FreeTextEntry1] : 6/5/21-H/H=12.6/48.9, WBC 7 with 35% poly's, 36% lymphs, platelet count 202,000. CA 27.29=16.9. AST/ALT=32/35. Alk. phos. and TB WNL.\par 8/31/2021–peripheral blood flow cytometry did not show significant numbers of circulating blasts or an abnormal lymphoid or myeloid population.  Lymphocytosis present.  Polytypic B cells.  T-cell showed no deletion or abnormal dim expression of pan T-cell antigens on significant subset of cells.

## 2021-10-08 ENCOUNTER — NON-APPOINTMENT (OUTPATIENT)
Age: 56
End: 2021-10-08

## 2021-12-06 LAB
ALBUMIN SERPL ELPH-MCNC: 4.6 G/DL
ALP BLD-CCNC: 79 U/L
ALT SERPL-CCNC: 30 U/L
ANION GAP SERPL CALC-SCNC: 13 MMOL/L
AST SERPL-CCNC: 30 U/L
BASOPHILS # BLD AUTO: 0.06 K/UL
BASOPHILS NFR BLD AUTO: 0.7 %
BILIRUB SERPL-MCNC: 0.8 MG/DL
BUN SERPL-MCNC: 8 MG/DL
CALCIUM SERPL-MCNC: 9.6 MG/DL
CHLORIDE SERPL-SCNC: 103 MMOL/L
CO2 SERPL-SCNC: 27 MMOL/L
CREAT SERPL-MCNC: 0.76 MG/DL
EOSINOPHIL # BLD AUTO: 0.59 K/UL
EOSINOPHIL NFR BLD AUTO: 6.8 %
GLUCOSE SERPL-MCNC: 89 MG/DL
HCT VFR BLD CALC: 41.6 %
HGB BLD-MCNC: 12.6 G/DL
IMM GRANULOCYTES NFR BLD AUTO: 0.2 %
LDH SERPL-CCNC: 209 U/L
LYMPHOCYTES # BLD AUTO: 4.8 K/UL
LYMPHOCYTES NFR BLD AUTO: 55.3 %
MAN DIFF?: NORMAL
MCHC RBC-ENTMCNC: 25.2 PG
MCHC RBC-ENTMCNC: 30.3 GM/DL
MCV RBC AUTO: 83.2 FL
MONOCYTES # BLD AUTO: 0.49 K/UL
MONOCYTES NFR BLD AUTO: 5.6 %
NEUTROPHILS # BLD AUTO: 2.72 K/UL
NEUTROPHILS NFR BLD AUTO: 31.4 %
PLATELET # BLD AUTO: 223 K/UL
POTASSIUM SERPL-SCNC: 3.9 MMOL/L
PROT SERPL-MCNC: 6.9 G/DL
RBC # BLD: 5 M/UL
RBC # FLD: 14.6 %
SODIUM SERPL-SCNC: 142 MMOL/L
WBC # FLD AUTO: 8.68 K/UL

## 2021-12-14 ENCOUNTER — TRANSCRIPTION ENCOUNTER (OUTPATIENT)
Age: 56
End: 2021-12-14

## 2022-01-06 ENCOUNTER — TRANSCRIPTION ENCOUNTER (OUTPATIENT)
Age: 57
End: 2022-01-06

## 2022-01-07 ENCOUNTER — OUTPATIENT (OUTPATIENT)
Dept: OUTPATIENT SERVICES | Facility: HOSPITAL | Age: 57
LOS: 1 days | Discharge: HOME | End: 2022-01-07

## 2022-01-07 DIAGNOSIS — D64.9 ANEMIA, UNSPECIFIED: ICD-10-CM

## 2022-01-10 ENCOUNTER — APPOINTMENT (OUTPATIENT)
Dept: HEMATOLOGY ONCOLOGY | Facility: CLINIC | Age: 57
End: 2022-01-10
Payer: MEDICAID

## 2022-01-10 VITALS
SYSTOLIC BLOOD PRESSURE: 113 MMHG | WEIGHT: 189.6 LBS | TEMPERATURE: 97.4 F | HEIGHT: 67.01 IN | HEART RATE: 69 BPM | BODY MASS INDEX: 29.76 KG/M2 | OXYGEN SATURATION: 98 % | RESPIRATION RATE: 16 BRPM | DIASTOLIC BLOOD PRESSURE: 77 MMHG

## 2022-01-10 PROCEDURE — 99214 OFFICE O/P EST MOD 30 MIN: CPT

## 2022-01-10 RX ORDER — NIACIN 500 MG/1
500 TABLET, EXTENDED RELEASE ORAL
Qty: 30 | Refills: 0 | Status: ACTIVE | COMMUNITY
Start: 2021-11-19

## 2022-01-10 NOTE — ASSESSMENT
[FreeTextEntry1] : Lab work, mammogram/breast US results reviewed.\par #1) history of right breast cancer-clinically SRIRAM. Continue oncologic surveillance. \par \par #2) ADH left breast-patent is agreeable to breast cancer surveillance. Breast imaging ordered by breast surgeon.\par \par #2) history of relative lymphocytosis/T-cell clonal population of undetermined significance. Reviewed that some bone marrow disorders/LPD's may evolve over time. In light of chronicity of her relative lymphocytosis and lack of related symptoms, patient has wished for continued lab work surveillance for now.  Should her hematologic/clinical scenario change/worsen, patient will reconsider BMB. \par  \par Patient was given the opportunity to ask questions. Her questions have been answered to her apparent satisfaction at this time.

## 2022-01-10 NOTE — PHYSICAL EXAM
[Fully active, able to carry on all pre-disease performance without restriction] : Status 0 - Fully active, able to carry on all pre-disease performance without restriction [Normal] : affect appropriate [de-identified] : no dominant mass, nipple retraction or discharge

## 2022-01-10 NOTE — HISTORY OF PRESENT ILLNESS
[Disease: _____________________] : Disease: [unfilled] [T: ___] : T[unfilled] [N: ___] : N[unfilled] [M: ___] : M[unfilled] [AJCC Stage: ____] : AJCC Stage: [unfilled] [de-identified] : History of right breast cancer 6/2006-Clinically Stage IIB (T2N1M0), ER negative/CT negative/HER-2 sandro negative. Status post neoadjuvant TAC--> right breast conservation surgery-->RT.\par \par History of relative increase in lymphocytes , at least dating back to 5/2008.\par 10/2014 Peripheral blood flow cytometry showed no morphologic or immunophenotypic evidence of a lymphoproliferative disorder or presence of blasts.  Smear review showed increase in eosinophils, and borderline increase in lymphocytes, which showed diminished expression of CD 7. These findings could be seen in a T cell lymphoproliferative condition. This may also be seen in inflammatory conditions where the loss is temporary in nature.\par 3/2016-Peripheral blood T cell gene rearrangement study showed a clonal T-cell receptor gamma population detected.\par 3/2016- Abdominal ultrasound-unremarkable size of the liver and spleen.\par 6/2016-T-cell gene clonality study was positive for a clonal T-cell receptor beta population.\par \par 2/2021-Left breast ADH s/p lumpectomy. Patient declining chemopreventative therapy. 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 [de-identified] : Feeling well.\par No lymph node complaints. No fevers, cough or SOB.\par Is working as private HHA still-works nights.\par No pulmonary complaints at this time.\par No new breast pain or lumps. Sees breast surgeon Q 6 months-has US.\par \par Had COVID vaccines (Pfizer) x 3.

## 2022-03-23 ENCOUNTER — NON-APPOINTMENT (OUTPATIENT)
Age: 57
End: 2022-03-23

## 2022-03-24 LAB — SARS-COV-2 N GENE NPH QL NAA+PROBE: NOT DETECTED

## 2022-03-28 ENCOUNTER — APPOINTMENT (OUTPATIENT)
Dept: PULMONOLOGY | Facility: CLINIC | Age: 57
End: 2022-03-28
Payer: MEDICAID

## 2022-03-28 VITALS
DIASTOLIC BLOOD PRESSURE: 70 MMHG | SYSTOLIC BLOOD PRESSURE: 100 MMHG | HEIGHT: 67.5 IN | HEART RATE: 67 BPM | TEMPERATURE: 97.8 F | WEIGHT: 184 LBS | BODY MASS INDEX: 28.54 KG/M2

## 2022-03-28 VITALS
BODY MASS INDEX: 28.54 KG/M2 | WEIGHT: 184 LBS | HEIGHT: 67.5 IN | SYSTOLIC BLOOD PRESSURE: 110 MMHG | DIASTOLIC BLOOD PRESSURE: 73 MMHG | RESPIRATION RATE: 16 BRPM | HEART RATE: 63 BPM | TEMPERATURE: 98 F | OXYGEN SATURATION: 97 %

## 2022-03-28 DIAGNOSIS — R94.2 ABNORMAL RESULTS OF PULMONARY FUNCTION STUDIES: ICD-10-CM

## 2022-03-28 PROCEDURE — 94726 PLETHYSMOGRAPHY LUNG VOLUMES: CPT

## 2022-03-28 PROCEDURE — 99203 OFFICE O/P NEW LOW 30 MIN: CPT | Mod: 25

## 2022-03-28 PROCEDURE — 94010 BREATHING CAPACITY TEST: CPT

## 2022-03-28 PROCEDURE — ZZZZZ: CPT

## 2022-03-28 PROCEDURE — 94729 DIFFUSING CAPACITY: CPT

## 2022-03-28 NOTE — ASSESSMENT
[FreeTextEntry1] : Abnormal PFT/ General pulmonary f/u: 56 year old female referred by PCP for pulmonary eval of abnormal Spirometry test performed during comprehensive physical/ wellness exam. Pt currently asymptomatic without respiratory complaints. PFT in office today normal. No additional w/u recommended. She can f/u prn.\par \par I, Jasmyn Hayes NP, am scribing for and in the presence of Dr. Emerson Weiss, the following sections HISTORY OF PRESENT ILLNESS, PAST MEDICAL/FAMILY/SOCIAL HISTORY; REVIEW OF SYSTEMS; VITAL SIGNS; PHYSICAL EXAM; DISPOSITION.\par

## 2022-03-28 NOTE — HISTORY OF PRESENT ILLNESS
[Never] : never [TextBox_4] : Pt referred for pulmonary eval of abnormal spirometry done at her routine physical. Denies SOB, cough, wheeze. No hx smoking.

## 2022-03-28 NOTE — PHYSICAL EXAM
[No Acute Distress] : no acute distress [Normal Rate/Rhythm] : normal rate/rhythm [Normal S1, S2] : normal s1, s2 [No Resp Distress] : no resp distress [Clear to Auscultation Bilaterally] : clear to auscultation bilaterally [Normal Gait] : normal gait [No Edema] : no edema [Oriented x3] : oriented x3

## 2022-03-28 NOTE — CONSULT LETTER
[Dear  ___] : Dear  [unfilled], [( Thank you for referring [unfilled] for consultation for _____ )] : Thank you for referring [unfilled] for consultation for [unfilled] [Please see my note below.] : Please see my note below. [Consult Closing:] : Thank you very much for allowing me to participate in the care of this patient.  If you have any questions, please do not hesitate to contact me. [FreeTextEntry2] : Pascual Aceves MD\par [address]\[place] [address]\par T: [phone]\par F: [phone] [FreeTextEntry3] : Emerson Weiss MD\par [facility] [place]\par [address]\[place] [address]\par [phone]

## 2022-05-05 ENCOUNTER — NON-APPOINTMENT (OUTPATIENT)
Age: 57
End: 2022-05-05

## 2022-06-27 ENCOUNTER — OUTPATIENT (OUTPATIENT)
Dept: OUTPATIENT SERVICES | Facility: HOSPITAL | Age: 57
LOS: 1 days | Discharge: HOME | End: 2022-06-27

## 2022-06-27 DIAGNOSIS — D64.9 ANEMIA, UNSPECIFIED: ICD-10-CM

## 2022-07-06 LAB
BASOPHILS # BLD AUTO: 0.04 K/UL
BASOPHILS NFR BLD AUTO: 0.7 %
EOSINOPHIL # BLD AUTO: 0.35 K/UL
EOSINOPHIL NFR BLD AUTO: 5.8 %
HCT VFR BLD CALC: 38.8 %
HGB BLD-MCNC: 12 G/DL
IMM GRANULOCYTES NFR BLD AUTO: 0 %
LYMPHOCYTES # BLD AUTO: 3.87 K/UL
LYMPHOCYTES NFR BLD AUTO: 64.4 %
MAN DIFF?: NORMAL
MCHC RBC-ENTMCNC: 26 PG
MCHC RBC-ENTMCNC: 30.9 GM/DL
MCV RBC AUTO: 84 FL
MONOCYTES # BLD AUTO: 0.33 K/UL
MONOCYTES NFR BLD AUTO: 5.5 %
NEUTROPHILS # BLD AUTO: 1.42 K/UL
NEUTROPHILS NFR BLD AUTO: 23.6 %
PLATELET # BLD AUTO: 196 K/UL
RBC # BLD: 4.62 M/UL
RBC # FLD: 14.4 %
WBC # FLD AUTO: 6.01 K/UL

## 2022-07-11 ENCOUNTER — APPOINTMENT (OUTPATIENT)
Dept: HEMATOLOGY ONCOLOGY | Facility: CLINIC | Age: 57
End: 2022-07-11

## 2022-07-11 VITALS
DIASTOLIC BLOOD PRESSURE: 69 MMHG | TEMPERATURE: 96.6 F | OXYGEN SATURATION: 98 % | SYSTOLIC BLOOD PRESSURE: 106 MMHG | RESPIRATION RATE: 16 BRPM | BODY MASS INDEX: 28.3 KG/M2 | WEIGHT: 183.4 LBS | HEART RATE: 62 BPM

## 2022-07-11 PROCEDURE — 99213 OFFICE O/P EST LOW 20 MIN: CPT

## 2022-07-11 RX ORDER — DICLOFENAC SODIUM 1% 10 MG/G
1 GEL TOPICAL
Qty: 100 | Refills: 0 | Status: ACTIVE | COMMUNITY
Start: 2022-01-11

## 2022-07-11 RX ORDER — BLOOD PRESSURE TEST KIT-WRIST
KIT MISCELLANEOUS
Qty: 1 | Refills: 0 | Status: ACTIVE | COMMUNITY
Start: 2022-06-07

## 2022-07-11 RX ORDER — ALBUTEROL SULFATE 90 UG/1
108 (90 BASE) INHALANT RESPIRATORY (INHALATION)
Qty: 8 | Refills: 0 | Status: ACTIVE | COMMUNITY
Start: 2022-05-19

## 2022-07-11 RX ORDER — NEBULIZER AND COMPRESSOR
EACH MISCELLANEOUS
Qty: 1 | Refills: 0 | Status: ACTIVE | COMMUNITY
Start: 2022-05-28

## 2022-07-11 RX ORDER — AZITHROMYCIN 250 MG/1
250 TABLET, FILM COATED ORAL
Qty: 6 | Refills: 0 | Status: DISCONTINUED | COMMUNITY
Start: 2022-06-14

## 2022-07-11 RX ORDER — MELOXICAM 7.5 MG/1
7.5 TABLET ORAL
Qty: 60 | Refills: 0 | Status: ACTIVE | COMMUNITY
Start: 2022-02-01

## 2022-07-11 RX ORDER — LEVALBUTEROL HYDROCHLORIDE 0.63 MG/3ML
0.63 SOLUTION RESPIRATORY (INHALATION)
Qty: 225 | Refills: 0 | Status: ACTIVE | COMMUNITY
Start: 2022-05-28

## 2022-07-11 NOTE — HISTORY OF PRESENT ILLNESS
[Disease: _____________________] : Disease: [unfilled] [T: ___] : T[unfilled] [N: ___] : N[unfilled] [M: ___] : M[unfilled] [AJCC Stage: ____] : AJCC Stage: [unfilled] [de-identified] : History of right breast cancer 6/2006-Clinically Stage IIB (T2N1M0), ER negative/[place] negative/HER-2 sandro negative. Status post neoadjuvant TAC--> right breast conservation surgery-->RT.\par \par History of relative increase in lymphocytes , at least dating back to 5/2008.\par 10/2014 Peripheral blood flow cytometry showed no morphologic or immunophenotypic evidence of a lymphoproliferative disorder or presence of blasts.  Smear review showed increase in eosinophils, and borderline increase in lymphocytes, which showed diminished expression of CD 7. These findings could be seen in a T cell lymphoproliferative condition. This may also be seen in inflammatory conditions where the loss is temporary in nature.\par 3/2016-Peripheral blood T cell gene rearrangement study showed a clonal T-cell receptor gamma population detected.\par 3/2016- Abdominal ultrasound-unremarkable size of the liver and spleen.\par 6/2016-T-cell gene clonality study was positive for a clonal T-cell receptor beta population.\par \par 2/2021-Left breast ADH s/p lumpectomy. Patient declining chemopreventative therapy. 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 [de-identified] : Saw pulmonologist-placed on nebulizer for possible asthma-has f/u again with him again next month.\par No lymph node complaints. No fevers, cough or SOB.\par No new breast pain or lumps. Sees breast surgeon Q 6 months-has US.\par Colonoscopy planned next month.\par Taking care of father (had a CVA).\par \par Had COVID vaccines (Pfizer) x 3.

## 2022-07-11 NOTE — PHYSICAL EXAM
[Fully active, able to carry on all pre-disease performance without restriction] : Status 0 - Fully active, able to carry on all pre-disease performance without restriction [Normal] : affect appropriate [de-identified] : no dominant mass, nipple retraction or discharge

## 2022-07-11 NOTE — ASSESSMENT
[FreeTextEntry1] : Lab work, breast US results reviewed.\par #1) history of right breast cancer-clinically SRIRAM. Continue oncologic surveillance. \par \par #2) ADH left breast-patent is agreeable to breast cancer surveillance. Next breast imaging ordered by breast surgeon.\par \par #2) history of relative lymphocytosis/T-cell clonal population of undetermined significance. Reviewed that some bone marrow disorders/LPD's may evolve over time. In light of chronicity of her relative lymphocytosis and lack of related symptoms, patient has wished for continued lab work surveillance.  Should her hematologic/clinical scenario change/worsen, patient may reconsider BMB. \par  \par Patient was given the opportunity to ask questions. Her questions have been answered to her apparent satisfaction at this time.

## 2023-01-16 ENCOUNTER — LABORATORY RESULT (OUTPATIENT)
Age: 58
End: 2023-01-16

## 2023-01-23 ENCOUNTER — OUTPATIENT (OUTPATIENT)
Dept: OUTPATIENT SERVICES | Facility: HOSPITAL | Age: 58
LOS: 1 days | Discharge: HOME | End: 2023-01-23

## 2023-01-23 DIAGNOSIS — D64.9 ANEMIA, UNSPECIFIED: ICD-10-CM

## 2023-01-30 ENCOUNTER — APPOINTMENT (OUTPATIENT)
Dept: HEMATOLOGY ONCOLOGY | Facility: CLINIC | Age: 58
End: 2023-01-30
Payer: MEDICAID

## 2023-01-30 VITALS
SYSTOLIC BLOOD PRESSURE: 113 MMHG | RESPIRATION RATE: 16 BRPM | TEMPERATURE: 97.3 F | BODY MASS INDEX: 27.32 KG/M2 | WEIGHT: 176.15 LBS | OXYGEN SATURATION: 98 % | HEART RATE: 79 BPM | HEIGHT: 67.48 IN | DIASTOLIC BLOOD PRESSURE: 76 MMHG

## 2023-01-30 PROCEDURE — 99213 OFFICE O/P EST LOW 20 MIN: CPT

## 2023-01-30 NOTE — RESULTS/DATA
[FreeTextEntry1] : 4/11/22–breast ultrasound–no sonographic evidence of malignancy.\par 11/2022-mammogram/breast US-no mammographic or sonographic evidence of malignancy.

## 2023-01-30 NOTE — HISTORY OF PRESENT ILLNESS
[Disease: _____________________] : Disease: [unfilled] [T: ___] : T[unfilled] [N: ___] : N[unfilled] [M: ___] : M[unfilled] [AJCC Stage: ____] : AJCC Stage: [unfilled] [de-identified] : History of right breast cancer 6/2006-Clinically Stage IIB (T2N1M0), ER negative/[place] negative/HER-2 sandro negative. Status post neoadjuvant TAC--> right breast conservation surgery-->RT.\par \par History of relative increase in lymphocytes , at least dating back to 5/2008.\par 10/2014 Peripheral blood flow cytometry showed no morphologic or immunophenotypic evidence of a lymphoproliferative disorder or presence of blasts.  Smear review showed increase in eosinophils, and borderline increase in lymphocytes, which showed diminished expression of CD 7. These findings could be seen in a T cell lymphoproliferative condition. This may also be seen in inflammatory conditions where the loss is temporary in nature.\par 3/2016-Peripheral blood T cell gene rearrangement study showed a clonal T-cell receptor gamma population detected.\par 3/2016- Abdominal ultrasound-unremarkable size of the liver and spleen.\par 6/2016-T-cell gene clonality study was positive for a clonal T-cell receptor beta population.\par \par 2/2021-Left breast ADH s/p lumpectomy. Patient declining chemopreventative therapy. 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 [de-identified] : Gets sinusitis intermittently.\par No lymph node complaints. No current fevers, cough or SOB.\par No new breast pain or lumps. Sees breast surgeon Q 6 months-has US.\par States had unremarkable colonoscopy since last visit.\par Taking care of father (had a CVA).\par \par Has hadCOVID vaccines (Pfizer) x 3.

## 2023-01-30 NOTE — ASSESSMENT
[FreeTextEntry1] : Lab work reviewed.\par #1) history of right breast cancer-clinically SRIRAM. Continue oncologic surveillance. \par \par #2) ADH left breast-patent is agreeable to breast cancer surveillance (declined chemopreventative therapy).\par \par #2) history of relative lymphocytosis/T-cell clonal population of undetermined significance. Reviewed that some bone marrow disorders/LPD's may evolve over time. In light of chronicity of her relative lymphocytosis and lack of related symptoms, patient has wished for continued lab work surveillance.  ANC currently acceptable. Should her hematologic/clinical scenario change/worsen, patient may reconsider BMB. \par  \par Patient was given the opportunity to ask questions. Her questions have been answered to her apparent satisfaction at this time.

## 2023-01-30 NOTE — PHYSICAL EXAM
[Fully active, able to carry on all pre-disease performance without restriction] : Status 0 - Fully active, able to carry on all pre-disease performance without restriction [Normal] : affect appropriate [de-identified] : no dominant mass, nipple retraction or discharge

## 2023-03-15 ENCOUNTER — APPOINTMENT (OUTPATIENT)
Dept: CT IMAGING | Facility: HOSPITAL | Age: 58
End: 2023-03-15

## 2023-03-15 ENCOUNTER — APPOINTMENT (OUTPATIENT)
Dept: INTERVENTIONAL RADIOLOGY/VASCULAR | Facility: HOSPITAL | Age: 58
End: 2023-03-15

## 2023-07-20 ENCOUNTER — OUTPATIENT (OUTPATIENT)
Dept: OUTPATIENT SERVICES | Facility: HOSPITAL | Age: 58
LOS: 1 days | Discharge: HOME | End: 2023-07-20

## 2023-07-20 DIAGNOSIS — D64.9 ANEMIA, UNSPECIFIED: ICD-10-CM

## 2023-07-31 ENCOUNTER — APPOINTMENT (OUTPATIENT)
Dept: HEMATOLOGY ONCOLOGY | Facility: CLINIC | Age: 58
End: 2023-07-31
Payer: MEDICAID

## 2023-07-31 VITALS
OXYGEN SATURATION: 97 % | HEART RATE: 69 BPM | TEMPERATURE: 97.1 F | WEIGHT: 184.06 LBS | BODY MASS INDEX: 28.42 KG/M2 | SYSTOLIC BLOOD PRESSURE: 113 MMHG | RESPIRATION RATE: 16 BRPM | DIASTOLIC BLOOD PRESSURE: 81 MMHG

## 2023-07-31 PROCEDURE — 99213 OFFICE O/P EST LOW 20 MIN: CPT

## 2023-07-31 NOTE — ASSESSMENT
[FreeTextEntry1] : Lab work reviewed. #1) History of right breast cancer 6/2006-Clinically Stage IIB (T2N1M0), ER negative/[place] negative/HER-2 sandro negative. Status post neoadjuvant TAC--> right breast conservation surgery-->RT. Clinically SRIRAM. Surveillance.   #2) ADH left breast-patent is agreeable to breast cancer surveillance (declined chemopreventative therapy). 11/22/2022-Mammogram/breast US-benign. Next breast imaging ordered by surgeon Dr. Marcelo.  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  10/2014 Peripheral blood flow cytometry showed no morphologic or immunophenotypic evidence of a lymphoproliferative disorder or presence of blasts.  Smear review showed increase in eosinophils, and borderline increase in lymphocytes, which showed diminished expression of CD 7. These findings could be seen in a T cell lymphoproliferative condition. This may also be seen in inflammatory conditions where the loss is temporary in nature. 3/2016-Peripheral blood T cell gene rearrangement study showed a clonal T-cell receptor gamma population detected. 3/2016- Abdominal ultrasound-unremarkable size of the liver and spleen. 6/2016-T-cell gene clonality study was positive for a clonal T-cell receptor beta population. --have reviewed that some bone marrow disorders/LPD's may evolve over time. In light of chronicity of her relative lymphocytosis and lack of related symptoms, patient has wished for continued lab work surveillance and no BMB.  ANC currently acceptable. Should her hematologic/clinical scenario change/worsen, patient may reconsider BMB.    Patient was given the opportunity to ask questions. Her questions have been answered to her apparent satisfaction at this time.  --RTO 6 months.

## 2023-07-31 NOTE — PHYSICAL EXAM
[Fully active, able to carry on all pre-disease performance without restriction] : Status 0 - Fully active, able to carry on all pre-disease performance without restriction [Normal] : affect appropriate [de-identified] : no dominant mass, nipple retraction or discharge

## 2023-07-31 NOTE — RESULTS/DATA
[FreeTextEntry1] : 4/11/22Â?breast ultrasoundÂ?no sonographic evidence of malignancy.\par 11/2022-mammogram/breast US-no mammographic or sonographic evidence of malignancy.

## 2023-07-31 NOTE — HISTORY OF PRESENT ILLNESS
[Disease: _____________________] : Disease: [unfilled] [T: ___] : T[unfilled] [N: ___] : N[unfilled] [M: ___] : M[unfilled] [AJCC Stage: ____] : AJCC Stage: [unfilled] [de-identified] : Infiltrating duct carcinoma [de-identified] : History of right breast cancer 6/2006-Clinically Stage IIB (T2N1M0), ER negative/[place] negative/HER-2 sandro negative. Status post neoadjuvant TAC--> right breast conservation surgery-->RT.  History of relative increase in lymphocytes , at least dating back to 5/2008. 10/2014 Peripheral blood flow cytometry showed no morphologic or immunophenotypic evidence of a lymphoproliferative disorder or presence of blasts.  Smear review showed increase in eosinophils, and borderline increase in lymphocytes, which showed diminished expression of CD 7. These findings could be seen in a T cell lymphoproliferative condition. This may also be seen in inflammatory conditions where the loss is temporary in nature. 3/2016-Peripheral blood T cell gene rearrangement study showed a clonal T-cell receptor gamma population detected. 3/2016- Abdominal ultrasound-unremarkable size of the liver and spleen. 6/2016-T-cell gene clonality study was positive for a clonal T-cell receptor beta population.  2/2021-Left breast ADH s/p lumpectomy. Patient declining chemopreventative therapy. [de-identified] : BRCA 1 sequencing (5 site rearrangement panel) no mutation detected.\par BRCA 2 sequencing-no mutation detected. [de-identified] : Feels well currently. No lymph node complaints. No current fevers, cough or SOB. No new breast pain or lumps. Sees breast surgeon Q 6 months-has US-has next appt. scheduled. States had unremarkable colonoscopy since last visit. Taking care of father (had a CVA).

## 2024-01-25 ENCOUNTER — OUTPATIENT (OUTPATIENT)
Dept: OUTPATIENT SERVICES | Facility: HOSPITAL | Age: 59
LOS: 1 days | Discharge: HOME | End: 2024-01-25

## 2024-01-25 DIAGNOSIS — D64.9 ANEMIA, UNSPECIFIED: ICD-10-CM

## 2024-01-30 ENCOUNTER — LABORATORY RESULT (OUTPATIENT)
Age: 59
End: 2024-01-30

## 2024-02-02 DIAGNOSIS — Z92.89 PERSONAL HISTORY OF OTHER MEDICAL TREATMENT: ICD-10-CM

## 2024-02-04 PROBLEM — N60.92 ATYPICAL DUCTAL HYPERPLASIA OF LEFT BREAST: Status: ACTIVE | Noted: 2021-03-11

## 2024-02-06 ENCOUNTER — APPOINTMENT (OUTPATIENT)
Dept: HEMATOLOGY ONCOLOGY | Facility: CLINIC | Age: 59
End: 2024-02-06
Payer: MEDICAID

## 2024-02-06 VITALS
WEIGHT: 172.4 LBS | TEMPERATURE: 97.7 F | SYSTOLIC BLOOD PRESSURE: 116 MMHG | HEART RATE: 78 BPM | OXYGEN SATURATION: 98 % | DIASTOLIC BLOOD PRESSURE: 79 MMHG | RESPIRATION RATE: 16 BRPM | HEIGHT: 67.52 IN | BODY MASS INDEX: 26.43 KG/M2

## 2024-02-06 DIAGNOSIS — N60.92 UNSPECIFIED BENIGN MAMMARY DYSPLASIA OF LEFT BREAST: ICD-10-CM

## 2024-02-06 DIAGNOSIS — Z85.3 PERSONAL HISTORY OF MALIGNANT NEOPLASM OF BREAST: ICD-10-CM

## 2024-02-06 DIAGNOSIS — D72.820 LYMPHOCYTOSIS (SYMPTOMATIC): ICD-10-CM

## 2024-02-06 PROCEDURE — 99214 OFFICE O/P EST MOD 30 MIN: CPT

## 2024-02-06 NOTE — PHYSICAL EXAM
[Fully active, able to carry on all pre-disease performance without restriction] : Status 0 - Fully active, able to carry on all pre-disease performance without restriction [Normal] : affect appropriate [de-identified] : no dominant mass, nipple retraction or discharge

## 2024-02-06 NOTE — HISTORY OF PRESENT ILLNESS
[Disease: _____________________] : Disease: [unfilled] [T: ___] : T[unfilled] [N: ___] : N[unfilled] [M: ___] : M[unfilled] [AJCC Stage: ____] : AJCC Stage: [unfilled] [de-identified] : History of right breast cancer 6/2006-Clinically Stage IIB (T2N1M0), ER negative/[place] negative/HER-2 sandro negative. Status post neoadjuvant TAC--> right breast conservation surgery-->RT.  History of relative increase in lymphocytes , at least dating back to 5/2008. 10/2014 Peripheral blood flow cytometry showed no morphologic or immunophenotypic evidence of a lymphoproliferative disorder or presence of blasts.  Smear review showed increase in eosinophils, and borderline increase in lymphocytes, which showed diminished expression of CD 7. These findings could be seen in a T cell lymphoproliferative condition. This may also be seen in inflammatory conditions where the loss is temporary in nature. 3/2016-Peripheral blood T cell gene rearrangement study showed a clonal T-cell receptor gamma population detected. 3/2016- Abdominal ultrasound-unremarkable size of the liver and spleen. 6/2016-T-cell gene clonality study was positive for a clonal T-cell receptor beta population.  2/2021-Left breast ADH s/p lumpectomy. Patient declining chemopreventative therapy. 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 [de-identified] : No new complaints. No lymph node complaints. No current fevers, cough or SOB. No new breast pain or lumps. Sees breast surgeon Q 6 months. Taking care of father (had a CVA)-was in rehab, but is coming home soon.

## 2024-02-06 NOTE — ASSESSMENT
[FreeTextEntry1] : Lab work reviewed. #1) History of right breast cancer 6/2006-Clinically Stage IIB (T2N1M0), ER negative/[place] negative/HER-2 sandro negative. Status post neoadjuvant TAC--> right breast conservation surgery-->RT. --Clinically SRIRAM.   #2) ADH left breast-patent is agreeable to breast cancer surveillance (declined chemopreventative therapy). 12/6/2023--Mammogram/breast US-probably benign. Recommend 6 month f/u right mammogram/breast US-ordered for June 2024 by surgeon.  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  10/2014 Peripheral blood flow cytometry showed no morphologic or immunophenotypic evidence of a lymphoproliferative disorder or presence of blasts.  Smear review showed increase in eosinophils, and borderline increase in lymphocytes, which showed diminished expression of CD 7. These findings could be seen in a T cell lymphoproliferative condition. This may also be seen in inflammatory conditions where the loss is temporary in nature. 3/2016-Peripheral blood T cell gene rearrangement study showed a clonal T-cell receptor gamma population detected. 3/2016- Abdominal ultrasound-unremarkable size of the liver and spleen. 6/2016-T-cell gene clonality study was positive for a clonal T-cell receptor beta population. --have reviewed that some bone marrow disorders/LPD's may evolve over time. In light of chronicity of her relative lymphocytosis and lack of related symptoms, patient has wished for continued lab work surveillance and no BMB.  ANC currently WNL. Should her hematologic/clinical scenario change/worsen, patient may reconsider BMB.    Patient was given the opportunity to ask questions. Her questions have been answered to her apparent satisfaction at this time.  -->RTO 6 months.

## 2024-07-17 ENCOUNTER — NON-APPOINTMENT (OUTPATIENT)
Age: 59
End: 2024-07-17

## 2024-07-30 ENCOUNTER — NON-APPOINTMENT (OUTPATIENT)
Age: 59
End: 2024-07-30

## 2024-07-31 ENCOUNTER — NON-APPOINTMENT (OUTPATIENT)
Age: 59
End: 2024-07-31

## 2024-07-31 DIAGNOSIS — N60.92 UNSPECIFIED BENIGN MAMMARY DYSPLASIA OF LEFT BREAST: ICD-10-CM

## 2024-07-31 DIAGNOSIS — Z85.3 PERSONAL HISTORY OF MALIGNANT NEOPLASM OF BREAST: ICD-10-CM

## 2024-08-11 ENCOUNTER — OUTPATIENT (OUTPATIENT)
Dept: OUTPATIENT SERVICES | Facility: HOSPITAL | Age: 59
LOS: 1 days | Discharge: HOME | End: 2024-08-11
Payer: MEDICAID

## 2024-08-11 DIAGNOSIS — D64.9 ANEMIA, UNSPECIFIED: ICD-10-CM

## 2024-08-13 ENCOUNTER — NON-APPOINTMENT (OUTPATIENT)
Age: 59
End: 2024-08-13

## 2024-08-14 ENCOUNTER — NON-APPOINTMENT (OUTPATIENT)
Age: 59
End: 2024-08-14

## 2024-08-20 ENCOUNTER — RESULT REVIEW (OUTPATIENT)
Age: 59
End: 2024-08-20

## 2024-08-20 ENCOUNTER — APPOINTMENT (OUTPATIENT)
Dept: HEMATOLOGY ONCOLOGY | Facility: CLINIC | Age: 59
End: 2024-08-20
Payer: MEDICAID

## 2024-08-20 ENCOUNTER — LABORATORY RESULT (OUTPATIENT)
Age: 59
End: 2024-08-20

## 2024-08-20 ENCOUNTER — NON-APPOINTMENT (OUTPATIENT)
Age: 59
End: 2024-08-20

## 2024-08-20 VITALS
DIASTOLIC BLOOD PRESSURE: 78 MMHG | HEIGHT: 67.52 IN | OXYGEN SATURATION: 99 % | TEMPERATURE: 97.7 F | SYSTOLIC BLOOD PRESSURE: 123 MMHG | BODY MASS INDEX: 25.96 KG/M2 | WEIGHT: 169.32 LBS | HEART RATE: 88 BPM | RESPIRATION RATE: 16 BRPM

## 2024-08-20 DIAGNOSIS — K75.2: ICD-10-CM

## 2024-08-20 DIAGNOSIS — Z45.2 ENCOUNTER FOR ADJUSTMENT AND MANAGEMENT OF VASCULAR ACCESS DEVICE: ICD-10-CM

## 2024-08-20 DIAGNOSIS — R31.9 HEMATURIA, UNSPECIFIED: ICD-10-CM

## 2024-08-20 DIAGNOSIS — R31.21 ASYMPTOMATIC MICROSCOPIC HEMATURIA: ICD-10-CM

## 2024-08-20 DIAGNOSIS — D72.820 LYMPHOCYTOSIS (SYMPTOMATIC): ICD-10-CM

## 2024-08-20 DIAGNOSIS — N60.92 UNSPECIFIED BENIGN MAMMARY DYSPLASIA OF LEFT BREAST: ICD-10-CM

## 2024-08-20 DIAGNOSIS — R11.2 NAUSEA WITH VOMITING, UNSPECIFIED: ICD-10-CM

## 2024-08-20 DIAGNOSIS — Z85.3 PERSONAL HISTORY OF MALIGNANT NEOPLASM OF BREAST: ICD-10-CM

## 2024-08-20 PROBLEM — C50.911: Status: ACTIVE | Noted: 2024-08-20

## 2024-08-20 LAB
BASOPHILS # BLD AUTO: 0.05 K/UL — SIGNIFICANT CHANGE UP (ref 0–0.2)
BASOPHILS NFR BLD AUTO: 0.7 % — SIGNIFICANT CHANGE UP (ref 0–2)
EOSINOPHIL # BLD AUTO: 0.19 K/UL — SIGNIFICANT CHANGE UP (ref 0–0.5)
EOSINOPHIL NFR BLD AUTO: 2.8 % — SIGNIFICANT CHANGE UP (ref 0–6)
HCT VFR BLD CALC: 43.7 % — SIGNIFICANT CHANGE UP (ref 34.5–45)
HGB BLD-MCNC: 13.7 G/DL — SIGNIFICANT CHANGE UP (ref 11.5–15.5)
IMM GRANULOCYTES NFR BLD AUTO: 0.1 % — SIGNIFICANT CHANGE UP (ref 0–0.9)
LYMPHOCYTES # BLD AUTO: 3.75 K/UL — HIGH (ref 1–3.3)
LYMPHOCYTES # BLD AUTO: 56 % — HIGH (ref 13–44)
MCHC RBC-ENTMCNC: 25.8 PG — LOW (ref 27–34)
MCHC RBC-ENTMCNC: 31.4 G/DL — LOW (ref 32–36)
MCV RBC AUTO: 82.3 FL — SIGNIFICANT CHANGE UP (ref 80–100)
MONOCYTES # BLD AUTO: 0.38 K/UL — SIGNIFICANT CHANGE UP (ref 0–0.9)
MONOCYTES NFR BLD AUTO: 5.7 % — SIGNIFICANT CHANGE UP (ref 2–14)
NEUTROPHILS # BLD AUTO: 2.32 K/UL — SIGNIFICANT CHANGE UP (ref 1.8–7.4)
NEUTROPHILS NFR BLD AUTO: 34.7 % — LOW (ref 43–77)
NRBC # BLD: 0 /100 WBCS — SIGNIFICANT CHANGE UP (ref 0–0)
PLATELET # BLD AUTO: 202 K/UL — SIGNIFICANT CHANGE UP (ref 150–400)
RBC # BLD: 5.31 M/UL — HIGH (ref 3.8–5.2)
RBC # FLD: 14.4 % — SIGNIFICANT CHANGE UP (ref 10.3–14.5)
WBC # BLD: 6.7 K/UL — SIGNIFICANT CHANGE UP (ref 3.8–10.5)
WBC # FLD AUTO: 6.7 K/UL — SIGNIFICANT CHANGE UP (ref 3.8–10.5)

## 2024-08-20 PROCEDURE — G2211 COMPLEX E/M VISIT ADD ON: CPT | Mod: NC

## 2024-08-20 PROCEDURE — 99215 OFFICE O/P EST HI 40 MIN: CPT

## 2024-08-20 RX ORDER — LIDOCAINE AND PRILOCAINE 25; 25 MG/G; MG/G
2.5-2.5 CREAM TOPICAL
Qty: 1 | Refills: 1 | Status: ACTIVE | COMMUNITY
Start: 2024-08-20 | End: 1900-01-01

## 2024-08-20 RX ORDER — PROCHLORPERAZINE MALEATE 10 MG/1
10 TABLET ORAL
Qty: 60 | Refills: 1 | Status: ACTIVE | COMMUNITY
Start: 2024-08-20 | End: 1900-01-01

## 2024-08-20 NOTE — HISTORY OF PRESENT ILLNESS
[Disease: _____________________] : Disease: [unfilled] [T: ___] : T[unfilled] [M: ___] : M[unfilled] [AJCC Stage: ____] : AJCC Stage: [unfilled] [N: ___] : N[unfilled] [de-identified] : History of right breast cancer 6/2006-Clinically Stage IIB (T2N1M0), ER negative/NH negative/HER-2 sandro negative. Status post neoadjuvant TAC--> right breast conservation surgery-->RT.  History of relative increase in lymphocytes , at least dating back to 5/2008. 10/2014 Peripheral blood flow cytometry showed no morphologic or immunophenotypic evidence of a lymphoproliferative disorder or presence of blasts.  Smear review showed increase in eosinophils, and borderline increase in lymphocytes, which showed diminished expression of CD 7. These findings could be seen in a T cell lymphoproliferative condition. This may also be seen in inflammatory conditions where the loss is temporary in nature. 3/2016-Peripheral blood T cell gene rearrangement study showed a clonal T-cell receptor gamma population detected. 3/2016- Abdominal ultrasound-unremarkable size of the liver and spleen. 6/2016-T-cell gene clonality study was positive for a clonal T-cell receptor beta population.  2/2021-Left breast ADH s/p lumpectomy. Patient declined chemopreventative therapy.  7/2/[id number]-Akdchmpfz-xszgqamnlm right breast mammographic mass corresponding to a sonographic mass at 12:00 retroareolar region.  3 additional suspicious right breast sonographic masses.  Ultrasound-guided core biopsy is recommended. 7/11/2024-US guided Right breast biopsies- #1-12:00, retroareolar-invasive ductal carcinoma, poorly differentiated #2)  6:00, retroareolar-invasive ductal carcinoma, poorly differentiated, ER-/NH-/Her2-(1+); Ki-67=80%. #3)  10:00, 4 cm FN-invasive ductal carcinoma, poorly differentiated.  DCIS, high nuclear grade, solid pattern, ER-/NH-/Her2-(1+); Ki-67=80%.  7/26/2024-Breast MRI-multifocal disease within the right breast without evidence of adenopathy (prior LND).  A dominant 4 cm enhancing mass with malignant kinetic curves at the retroareolar region of the right breast at 12:00 and an additional area of enhancement at right breast 10:00 measuring 2.5 cm.  8/12/2024-PET/CT scan-multifocal hypermetabolic neoplasm noted in the right breast.  Multiple hypermetabolic lymph node metastases noted in the right internal mammary chain.   [de-identified] : Multifocal Infiltrating duct carcinoma [de-identified] :  For 6:00 and 10:00 lesions: ER- (<1%)/[place]-(<1%)/Her2-(1+); Ki-67=80%. [de-identified] : BRCA 1 sequencing (5 site rearrangement panel) no mutation detected.\par  BRCA 2 sequencing-no mutation detected. [de-identified] : Saw surgeon-recommended neoadjuvant systemic therapy. Was supposed to have port placement tomorrow (Dr. Nicholson)-abnormal UA with RBC reportedly, so being rescheduled for possibly Friday.  No complaints of dysuria, urinary frequency or pressure, back pain, gross hematuria.  Patient reports she has a long history of microscopic hematuria for which she has had urologic evaluation in the past. No lymph node complaints. No current fevers, cough or SOB. Taking care of father (had a CVA)-he lives in a nearby home.  Patient has 1 daughter whom she says is battling depression and is not of much support.  She does have a sister whom she has notified of her current diagnosis.  She also has a brother who may be of assistance if needed pending course.

## 2024-08-20 NOTE — HISTORY OF PRESENT ILLNESS
[Disease: _____________________] : Disease: [unfilled] [T: ___] : T[unfilled] [M: ___] : M[unfilled] [AJCC Stage: ____] : AJCC Stage: [unfilled] [N: ___] : N[unfilled] [de-identified] : History of right breast cancer 6/2006-Clinically Stage IIB (T2N1M0), ER negative/IN negative/HER-2 sandro negative. Status post neoadjuvant TAC--> right breast conservation surgery-->RT.  History of relative increase in lymphocytes , at least dating back to 5/2008. 10/2014 Peripheral blood flow cytometry showed no morphologic or immunophenotypic evidence of a lymphoproliferative disorder or presence of blasts.  Smear review showed increase in eosinophils, and borderline increase in lymphocytes, which showed diminished expression of CD 7. These findings could be seen in a T cell lymphoproliferative condition. This may also be seen in inflammatory conditions where the loss is temporary in nature. 3/2016-Peripheral blood T cell gene rearrangement study showed a clonal T-cell receptor gamma population detected. 3/2016- Abdominal ultrasound-unremarkable size of the liver and spleen. 6/2016-T-cell gene clonality study was positive for a clonal T-cell receptor beta population.  2/2021-Left breast ADH s/p lumpectomy. Patient declined chemopreventative therapy.  7/2/[id number]-Nkusrmqcg-hvnzjdnjsk right breast mammographic mass corresponding to a sonographic mass at 12:00 retroareolar region.  3 additional suspicious right breast sonographic masses.  Ultrasound-guided core biopsy is recommended. 7/11/2024-US guided Right breast biopsies- #1-12:00, retroareolar-invasive ductal carcinoma, poorly differentiated #2)  6:00, retroareolar-invasive ductal carcinoma, poorly differentiated, ER-/[place]-/Her2-(1+); Ki-67=80%. #3)  10:00, 4 cm FN-invasive ductal carcinoma, poorly differentiated.  DCIS, high nuclear grade, solid pattern, ER-/IN-/Her2-(1+); Ki-67=80%.  7/26/2024-Breast MRI-multifocal disease within the right breast without evidence of adenopathy (prior LND).  A dominant 4 cm enhancing mass with malignant kinetic curves at the retroareolar region of the right breast at 12:00 and an additional area of enhancement at right breast 10:00 measuring 2.5 cm.  8/12/2024-PET/CT scan-multifocal hypermetabolic neoplasm noted in the right breast.  Multiple hypermetabolic lymph node metastases noted in the right internal mammary chain.   [de-identified] : Multifocal Infiltrating duct carcinoma [de-identified] :  For 6:00 and 10:00 lesions: ER- (<1%)/[place]-(<1%)/Her2-(1+); Ki-67=80%. [de-identified] : BRCA 1 sequencing (5 site rearrangement panel) no mutation detected.\par  BRCA 2 sequencing-no mutation detected. [de-identified] : Saw surgeon-recommended neoadjuvant systemic therapy. Was supposed to have port placement tomorrow (Dr. Nicholson)-abnormal UA with RBC reportedly, so being rescheduled for possibly Friday.  No complaints of dysuria, urinary frequency or pressure, back pain, gross hematuria.  Patient reports she has a long history of microscopic hematuria for which she has had urologic evaluation in the past. No lymph node complaints. No current fevers, cough or SOB. Taking care of father (had a CVA)-he lives in a nearby home.  Patient has 1 daughter whom she says is battling depression and is not of much support.  She does have a sister whom she has notified of her current diagnosis.  She also has a brother who may be of assistance if needed pending course.

## 2024-08-20 NOTE — PHYSICAL EXAM
[Fully active, able to carry on all pre-disease performance without restriction] : Status 0 - Fully active, able to carry on all pre-disease performance without restriction [Normal] : affect appropriate [de-identified] : ~4-5 cm firm mass at ~12:00 right breast above areola; ~2 cm mass at ~6:00 inf. to areola; no dominant left breast mass palpable

## 2024-08-20 NOTE — PHYSICAL EXAM
"Subjective:       Patient ID: Julieta Yanes is a 39 y.o. female.    Vitals:  height is 5' 2" (1.575 m) and weight is 81.6 kg (180 lb). Her temperature is 97.7 °F (36.5 °C). Her blood pressure is 120/77 and her pulse is 67. Her respiration is 18 and oxygen saturation is 97%.     Chief Complaint: Cough    Cough   This is a new problem. Episode onset: 5 days. The problem has been unchanged. The problem occurs constantly. The cough is productive of sputum. Associated symptoms include ear pain, a fever, headaches and a sore throat. Pertinent negatives include no chest pain, chills, eye redness, myalgias, shortness of breath or wheezing. Nothing aggravates the symptoms. She has tried OTC cough suppressant for the symptoms. The treatment provided mild relief.     Review of Systems   Constitution: Positive for fever and malaise/fatigue. Negative for chills.   HENT: Positive for congestion, ear pain and sore throat. Negative for hoarse voice.    Eyes: Negative for discharge and redness.   Cardiovascular: Negative for chest pain, dyspnea on exertion and leg swelling.   Respiratory: Positive for cough. Negative for shortness of breath, sputum production and wheezing.    Musculoskeletal: Negative for myalgias.   Gastrointestinal: Negative for abdominal pain and nausea.   Neurological: Positive for headaches.       Objective:      Physical Exam   Constitutional: She is oriented to person, place, and time. She appears well-developed and well-nourished. She is cooperative.  Non-toxic appearance. She does not appear ill. No distress.   HENT:   Head: Normocephalic and atraumatic.   Right Ear: Hearing, tympanic membrane, external ear and ear canal normal.   Left Ear: Hearing, tympanic membrane, external ear and ear canal normal.   Nose: Rhinorrhea present. No mucosal edema or nasal deformity. No epistaxis. Right sinus exhibits no maxillary sinus tenderness and no frontal sinus tenderness. Left sinus exhibits no maxillary sinus " tenderness and no frontal sinus tenderness.   Mouth/Throat: Uvula is midline and mucous membranes are normal. No trismus in the jaw. Normal dentition. No uvula swelling. Posterior oropharyngeal erythema present. Tonsils are 1+ on the right. Tonsils are 1+ on the left. No tonsillar exudate.   Eyes: Conjunctivae and lids are normal. No scleral icterus.   Sclera clear bilat   Neck: Trachea normal, full passive range of motion without pain and phonation normal. Neck supple.   Cardiovascular: Normal rate, regular rhythm, normal heart sounds, intact distal pulses and normal pulses.   Pulmonary/Chest: Effort normal and breath sounds normal. No respiratory distress.   Abdominal: Soft. Normal appearance and bowel sounds are normal. She exhibits no distension. There is no tenderness.   Musculoskeletal: Normal range of motion. She exhibits no edema or deformity.   Neurological: She is alert and oriented to person, place, and time. She exhibits normal muscle tone. Coordination normal.   Skin: Skin is warm, dry and intact. She is not diaphoretic. No pallor.   Psychiatric: She has a normal mood and affect. Her speech is normal and behavior is normal. Judgment and thought content normal. Cognition and memory are normal.   Nursing note and vitals reviewed.      Assessment:       1. Viral URI with cough        Plan:         Viral URI with cough  -     betamethasone acetate-betamethasone sodium phosphate injection 6 mg; Inject 1 mL (6 mg total) into the muscle one time.  -     benzonatate (TESSALON PERLES) 100 MG capsule; Take 2 capsules (200 mg total) by mouth 3 (three) times daily as needed for Cough.  Dispense: 40 capsule; Refill: 0  -     guaifenesin-codeine 100-10 mg/5 ml (TUSSI-ORGANIDIN NR)  mg/5 mL syrup; Take 5 mLs by mouth 3 (three) times daily as needed for Cough.  Dispense: 120 mL; Refill: 0          Viral Upper Respiratory Illness (Adult)  You have a viral upper respiratory illness (URI), which is another term for  the common cold. This illness is contagious during the first few days. It is spread through the air by coughing and sneezing. It may also be spread by direct contact (touching the sick person and then touching your own eyes, nose, or mouth). Frequent handwashing will decrease risk of spread. Most viral illnesses go away within 7 to 10 days with rest and simple home remedies. Sometimes the illness may last for several weeks. Antibiotics will not kill a virus, and they are generally not prescribed for this condition.    Home care  · If symptoms are severe, rest at home for the first 2 to 3 days. When you resume activity, don't let yourself get too tired.  · Avoid being exposed to cigarette smoke (yours or others).  · You may use acetaminophen or ibuprofen to control pain and fever, unless another medicine was prescribed. (Note: If you have chronic liver or kidney disease, have ever had a stomach ulcer or gastrointestinal bleeding, or are taking blood-thinning medicines, talk with your healthcare provider before using these medicines.) Aspirin should never be given to anyone under 18 years of age who is ill with a viral infection or fever. It may cause severe liver or brain damage.  · Your appetite may be poor, so a light diet is fine. Avoid dehydration by drinking 6 to 8 glasses of fluids per day (water, soft drinks, juices, tea, or soup). Extra fluids will help loosen secretions in the nose and lungs.  · Over-the-counter cold medicines will not shorten the length of time youre sick, but they may be helpful for the following symptoms: cough, sore throat, and nasal and sinus congestion. (Note: Do not use decongestants if you have high blood pressure.)  Follow-up care  Follow up with your healthcare provider, or as advised.  When to seek medical advice  Call your healthcare provider right away if any of these occur:  · Cough with lots of colored sputum (mucus)  · Severe headache; face, neck, or ear  pain  · Difficulty swallowing due to throat pain  · Fever of 100.4°F (38°C)  Call 911, or get immediate medical care  Call emergency services right away if any of these occur:  · Chest pain, shortness of breath, wheezing, or difficulty breathing  · Coughing up blood  · Inability to swallow due to throat pain  Date Last Reviewed: 9/13/2015  © 9314-8189 [employer]. [address] [place] [address], [place] [address]. All rights reserved. This information is not intended as a substitute for professional medical care. Always follow your healthcare professional's instructions.      Please follow up with your Primary care provider within 2-5 days if your signs and symptoms have not resolved or worsen.     If your condition worsens or fails to improve we recommend that you receive another evaluation at the emergency room immediately or contact your primary medical clinic to discuss your concerns.   You must understand that you have received an Urgent Care treatment only and that you may be released before all of your medical problems are known or treated. You, the patient, will arrange for follow up care as instructed.     RED FLAGS/WARNING SYMPTOMS DISCUSSED WITH PATIENT THAT WOULD WARRANT EMERGENT MEDICAL ATTENTION. PATIENT VERBALIZED UNDERSTANDING.          [Fully active, able to carry on all pre-disease performance without restriction] : Status 0 - Fully active, able to carry on all pre-disease performance without restriction [Normal] : affect appropriate [de-identified] : ~4-5 cm firm mass at ~12:00 right breast above areola; ~2 cm mass at ~6:00 inf. to areola; no dominant left breast mass palpable

## 2024-08-20 NOTE — PHYSICAL EXAM
[Fully active, able to carry on all pre-disease performance without restriction] : Status 0 - Fully active, able to carry on all pre-disease performance without restriction [Normal] : affect appropriate [de-identified] : ~4-5 cm firm mass at ~12:00 right breast above areola; ~2 cm mass at ~6:00 inf. to areola; no dominant left breast mass palpable

## 2024-08-20 NOTE — ASSESSMENT
[FreeTextEntry1] : Lab work, breast MRI and PET/CT scan reports reviewed. #1) History of right breast cancer 6/2006-Clinically Stage IIB (T2N1M0), ER negative/CT negative/HER-2 sandro negative. Status post neoadjuvant TAC--> right breast conservation surgery-->RT.  #2) ADH left breast-patent was agreeable to breast cancer surveillance (declined chemopreventative therapy).  #3) Now, multifocal right breast cancer: 7/2/20241451-[id number]-cfjbbwvrfo right breast mammographic mass corresponding to a sonographic mass at 12:00 retroareolar region.  3 additional suspicious right breast sonographic masses. 7/11/2024-US guided Right breast biopsies- #1-12:00, retroareolar-invasive ductal carcinoma, poorly differentiated #2)  6:00, retroareolar-invasive ductal carcinoma, poorly differentiated, ER-/CT-/Her2-(1+); Ki-67=80%. #3)  10:00, 4 cm FN-invasive ductal carcinoma, poorly differentiated.  DCIS, high nuclear grade, solid pattern, ER-/CT-/Her2-(1+); Ki-67=80%.  7/26/2024-Breast MRI-multifocal disease within the right breast without evidence of adenopathy (prior LND).  A dominant 4 cm enhancing mass with malignant kinetic curves at the retroareolar region of the right breast at 12:00 and an additional area of enhancement at right breast 10:00 measuring 2.5 cm.  8/12/2024-PET/CT scan-multifocal hypermetabolic neoplasm noted in the right breast.  Multiple hypermetabolic lymph node metastases noted in the right internal mammary chain.  --Reviewed diagnosis/prognosis and management options. For TNBC, recommend neoadjuvant Keynote 522 protocol. As patient has had adriamycin in the past, need to have cardiology see patient and advise-had discussed case with Dr. Olivarez-will refer patient to see him ASAP. --Post neoadjuvant systemic therapy, to re-eval. for surgery (patient considering b/l mastectomies at this point), and then ?RT. --Pending pathologic response to neoadjuvant therapy, further adjuvant therapy decisions to be finalized.  --obtain cardiac echo --obtain lab work --mediport placement by surgeon --EKG-obtain recent report from [facility] --[facility] Dr. Oliv[facility] --have path slides reviewed by [facility] pathology; need ER/CT/Her 2 report from 12:00 lesion-requested.  #4) history of relative lymphocytosis/T-cell clonal population of undetermined significance.  10/2014 Peripheral blood flow cytometry showed no morphologic or immunophenotypic evidence of a lymphoproliferative disorder or presence of blasts.  Smear review showed increase in eosinophils, and borderline increase in lymphocytes, which showed diminished expression of CD 7. These findings could be seen in a T cell lymphoproliferative condition. This may also be seen in inflammatory conditions where the loss is temporary in nature. 3/2016-Peripheral blood T cell gene rearrangement study showed a clonal T-cell receptor gamma population detected. 3/2016- Abdominal ultrasound-unremarkable size of the liver and spleen. 6/2016-T-cell gene clonality study was positive for a clonal T-cell receptor beta population. --have reviewed that some bone marrow disorders/LPD's may evolve over time. In light of chronicity of her relative lymphocytosis and lack of related symptoms, patient has wished for continued lab work surveillance and no BMB.  Will need growth factor support during chemotherapy.  Should her hematologic/clinical scenario change/worsen, may need to reconsider BMB.    Patient was given the opportunity to ask questions. Her questions have been answered to her apparent satisfaction at this time. Case discussed with surgeon Dr. Nicholson today.  She reports internal mammary nodes retrosternal with no plans to biopsy at this time.  Will have interval follow-up imaging prior to eventual surgery.  She is arranging for Mediport placement this week. Case to be presented at multidisciplinary breast TB conference 8/26/2024.  -->Plan: Neoadjuvant: Pembrolizumab 200 mg IV day 1, Paclitaxel 80 mg/m IV days 1, 8, 15, Carboplatin with AUC of 1.5 IV days 1, 8, 15.  Cycle is every 21 days x 4 cycles.  This would be followed with Pembrolizumab 200 mg IV day 1, Doxorubicin 60 mg/m IV day 1, Cyclophosphamide 600 mg/m IV day 1 with cycle every 21 days x 4 cycles.

## 2024-08-20 NOTE — ASSESSMENT
[FreeTextEntry1] : Lab work, breast MRI and PET/CT scan reports reviewed. #1) History of right breast cancer 6/2006-Clinically Stage IIB (T2N1M0), ER negative/MA negative/HER-2 sandro negative. Status post neoadjuvant TAC--> right breast conservation surgery-->RT.  #2) ADH left breast-patent was agreeable to breast cancer surveillance (declined chemopreventative therapy).  #3) Now, multifocal right breast cancer: 7/2/20243769-Bnzlwumzl-irkkvvnmmr right breast mammographic mass corresponding to a sonographic mass at 12:00 retroareolar region.  3 additional suspicious right breast sonographic masses. 7/11/2024-US guided Right breast biopsies- #1-12:00, retroareolar-invasive ductal carcinoma, poorly differentiated #2)  6:00, retroareolar-invasive ductal carcinoma, poorly differentiated, ER-/[place]-/Her2-(1+); Ki-67=80%. #3)  10:00, 4 cm FN-invasive ductal carcinoma, poorly differentiated.  DCIS, high nuclear grade, solid pattern, ER-/[place]-/Her2-(1+); Ki-67=80%.  7/26/2024-Breast MRI-multifocal disease within the right breast without evidence of adenopathy (prior LND).  A dominant 4 cm enhancing mass with malignant kinetic curves at the retroareolar region of the right breast at 12:00 and an additional area of enhancement at right breast 10:00 measuring 2.5 cm.  8/12/2024-PET/CT scan-multifocal hypermetabolic neoplasm noted in the right breast.  Multiple hypermetabolic lymph node metastases noted in the right internal mammary chain.  --Reviewed diagnosis/prognosis and management options. For TNBC, recommend neoadjuvant Keynote 522 protocol. As patient has had adriamycin in the past, need to have cardiology see patient and advise-had discussed case with Dr. Olivarez-will refer patient to see him ASAP. --Post neoadjuvant systemic therapy, to re-eval. for surgery (patient considering b/l mastectomies at this point), and then ?RT. --Pending pathologic response to neoadjuvant therapy, further adjuvant therapy decisions to be finalized.  --obtain cardiac echo --obtain lab work --mediport placement by surgeon --EKG-obtain recent report from [facility] --[facility] --have path slides reviewed by [facility] pathology; need ER/MA/Her 2 report from 12:00 lesion-requested.  #4) history of relative lymphocytosis/T-cell clonal population of undetermined significance.  10/2014 Peripheral blood flow cytometry showed no morphologic or immunophenotypic evidence of a lymphoproliferative disorder or presence of blasts.  Smear review showed increase in eosinophils, and borderline increase in lymphocytes, which showed diminished expression of CD 7. These findings could be seen in a T cell lymphoproliferative condition. This may also be seen in inflammatory conditions where the loss is temporary in nature. 3/2016-Peripheral blood T cell gene rearrangement study showed a clonal T-cell receptor gamma population detected. 3/2016- Abdominal ultrasound-unremarkable size of the liver and spleen. 6/2016-T-cell gene clonality study was positive for a clonal T-cell receptor beta population. --have reviewed that some bone marrow disorders/LPD's may evolve over time. In light of chronicity of her relative lymphocytosis and lack of related symptoms, patient has wished for continued lab work surveillance and no BMB.  Will need growth factor support during chemotherapy.  Should her hematologic/clinical scenario change/worsen, may need to reconsider BMB.    Patient was given the opportunity to ask questions. Her questions have been answered to her apparent satisfaction at this time. Case discussed with surgeon Dr. Nicholson today.  She reports internal mammary nodes retrosternal with no plans to biopsy at this time.  Will have interval follow-up imaging prior to eventual surgery.  She is arranging for Mediport placement this week. Case to be presented at multidisciplinary breast TB conference 8/26/2024.  -->Plan: Neoadjuvant: Pembrolizumab 200 mg IV day 1, Paclitaxel 80 mg/m IV days 1, 8, 15, Carboplatin with AUC of 1.5 IV days 1, 8, 15.  Cycle is every 21 days x 4 cycles.  This would be followed with Pembrolizumab 200 mg IV day 1, Doxorubicin 60 mg/m IV day 1, Cyclophosphamide 600 mg/m IV day 1 with cycle every 21 days x 4 cycles.

## 2024-08-20 NOTE — CONSULT LETTER
[Dear  ___] : Dear  [unfilled], [Courtesy Letter:] : I had the pleasure of seeing your patient, [unfilled], in my office today. [Please see my note below.] : Please see my note below. [Consult Closing:] : Thank you very much for allowing me to participate in the care of this patient.  If you have any questions, please do not hesitate to contact me. [Sincerely,] : Sincerely, [DrWeslye  ___] : Dr. GUAN [FreeTextEntry3] : Jada Herron MD

## 2024-08-20 NOTE — CONSULT LETTER
[Dear  ___] : Dear  [unfilled], [Courtesy Letter:] : I had the pleasure of seeing your patient, [unfilled], in my office today. [Please see my note below.] : Please see my note below. [Consult Closing:] : Thank you very much for allowing me to participate in the care of this patient.  If you have any questions, please do not hesitate to contact me. [Sincerely,] : Sincerely, [DrWesley  ___] : Dr. GAUN [FreeTextEntry3] : Jada Herron MD

## 2024-08-20 NOTE — ASSESSMENT
[FreeTextEntry1] : Lab work, breast MRI and PET/CT scan reports reviewed. #1) History of right breast cancer 6/2006-Clinically Stage IIB (T2N1M0), ER negative/[place] negative/HER-2 sandro negative. Status post neoadjuvant TAC--> right breast conservation surgery-->RT.  #2) ADH left breast-patent was agreeable to breast cancer surveillance (declined chemopreventative therapy).  #3) Now, multifocal right breast cancer: 7/2/20244660-Xaqaybhsk-kwporvvxmz right breast mammographic mass corresponding to a sonographic mass at 12:00 retroareolar region.  3 additional suspicious right breast sonographic masses. 7/11/2024-US guided Right breast biopsies- #1-12:00, retroareolar-invasive ductal carcinoma, poorly differentiated #2)  6:00, retroareolar-invasive ductal carcinoma, poorly differentiated, ER-/[place]-/Her2-(1+); Ki-67=80%. #3)  10:00, 4 cm FN-invasive ductal carcinoma, poorly differentiated.  DCIS, high nuclear grade, solid pattern, ER-/[place]-/Her2-(1+); Ki-67=80%.  7/26/2024-Breast MRI-multifocal disease within the right breast without evidence of adenopathy (prior LND).  A dominant 4 cm enhancing mass with malignant kinetic curves at the retroareolar region of the right breast at 12:00 and an additional area of enhancement at right breast 10:00 measuring 2.5 cm.  8/12/2024-PET/CT scan-multifocal hypermetabolic neoplasm noted in the right breast.  Multiple hypermetabolic lymph node metastases noted in the right internal mammary chain.  --Reviewed diagnosis/prognosis and management options. For TNBC, recommend neoadjuvant Keynote 522 protocol. As patient has had adriamycin in the past, need to have cardiology see patient and advise-had discussed case with Dr. Olivarez-will refer patient to see him ASAP. --Post neoadjuvant systemic therapy, to re-eval. for surgery (patient considering b/l mastectomies at this point), and then ?RT. --Pending pathologic response to neoadjuvant therapy, further adjuvant therapy decisions to be finalized.  --obtain cardiac echo --obtain lab work --mediport placement by surgeon --EKG-obtain recent report from [facility] --refer to [facility] cardiology, Dr. Olivarez --have path slides reviewed by [facility] pathology; need ER/NV/Her 2 report from 12:00 lesion-requested.  #4) history of relative lymphocytosis/T-cell clonal population of undetermined significance.  10/2014 Peripheral blood flow cytometry showed no morphologic or immunophenotypic evidence of a lymphoproliferative disorder or presence of blasts.  Smear review showed increase in eosinophils, and borderline increase in lymphocytes, which showed diminished expression of CD 7. These findings could be seen in a T cell lymphoproliferative condition. This may also be seen in inflammatory conditions where the loss is temporary in nature. 3/2016-Peripheral blood T cell gene rearrangement study showed a clonal T-cell receptor gamma population detected. 3/2016- Abdominal ultrasound-unremarkable size of the liver and spleen. 6/2016-T-cell gene clonality study was positive for a clonal T-cell receptor beta population. --have reviewed that some bone marrow disorders/LPD's may evolve over time. In light of chronicity of her relative lymphocytosis and lack of related symptoms, patient has wished for continued lab work surveillance and no BMB.  Will need growth factor support during chemotherapy.  Should her hematologic/clinical scenario change/worsen, may need to reconsider BMB.    Patient was given the opportunity to ask questions. Her questions have been answered to her apparent satisfaction at this time. Case discussed with surgeon Dr. Nicholson today.  She reports internal mammary nodes retrosternal with no plans to biopsy at this time.  Will have interval follow-up imaging prior to eventual surgery.  She is arranging for Mediport placement this week. Case to be presented at multidisciplinary breast TB conference 8/26/2024.  -->Plan: Neoadjuvant: Pembrolizumab 200 mg IV day 1, Paclitaxel 80 mg/m IV days 1, 8, 15, Carboplatin with AUC of 1.5 IV days 1, 8, 15.  Cycle is every 21 days x 4 cycles.  This would be followed with Pembrolizumab 200 mg IV day 1, Doxorubicin 60 mg/m IV day 1, Cyclophosphamide 600 mg/m IV day 1 with cycle every 21 days x 4 cycles.

## 2024-08-20 NOTE — HISTORY OF PRESENT ILLNESS
[Disease: _____________________] : Disease: [unfilled] [T: ___] : T[unfilled] [M: ___] : M[unfilled] [AJCC Stage: ____] : AJCC Stage: [unfilled] [N: ___] : N[unfilled] [de-identified] : History of right breast cancer 6/2006-Clinically Stage IIB (T2N1M0), ER negative/[place] negative/HER-2 sandro negative. Status post neoadjuvant TAC--> right breast conservation surgery-->RT.  History of relative increase in lymphocytes , at least dating back to 5/2008. 10/2014 Peripheral blood flow cytometry showed no morphologic or immunophenotypic evidence of a lymphoproliferative disorder or presence of blasts.  Smear review showed increase in eosinophils, and borderline increase in lymphocytes, which showed diminished expression of CD 7. These findings could be seen in a T cell lymphoproliferative condition. This may also be seen in inflammatory conditions where the loss is temporary in nature. 3/2016-Peripheral blood T cell gene rearrangement study showed a clonal T-cell receptor gamma population detected. 3/2016- Abdominal ultrasound-unremarkable size of the liver and spleen. 6/2016-T-cell gene clonality study was positive for a clonal T-cell receptor beta population.  2/2021-Left breast ADH s/p lumpectomy. Patient declined chemopreventative therapy.  7/2/[id number]-Isbtraowm-shnlsjydck right breast mammographic mass corresponding to a sonographic mass at 12:00 retroareolar region.  3 additional suspicious right breast sonographic masses.  Ultrasound-guided core biopsy is recommended. 7/11/2024-US guided Right breast biopsies- #1-12:00, retroareolar-invasive ductal carcinoma, poorly differentiated #2)  6:00, retroareolar-invasive ductal carcinoma, poorly differentiated, ER-/[place]-/Her2-(1+); Ki-67=80%. #3)  10:00, 4 cm FN-invasive ductal carcinoma, poorly differentiated.  DCIS, high nuclear grade, solid pattern, ER-/[place]-/Her2-(1+); Ki-67=80%.  7/26/2024-Breast MRI-multifocal disease within the right breast without evidence of adenopathy (prior LND).  A dominant 4 cm enhancing mass with malignant kinetic curves at the retroareolar region of the right breast at 12:00 and an additional area of enhancement at right breast 10:00 measuring 2.5 cm.  8/12/2024-PET/CT scan-multifocal hypermetabolic neoplasm noted in the right breast.  Multiple hypermetabolic lymph node metastases noted in the right internal mammary chain.   [de-identified] : Multifocal Infiltrating duct carcinoma [de-identified] :  For 6:00 and 10:00 lesions: ER- (<1%)/[place]-(<1%)/Her2-(1+); Ki-67=80%. [de-identified] : BRCA 1 sequencing (5 site rearrangement panel) no mutation detected.\par  BRCA 2 sequencing-no mutation detected. [de-identified] : Saw surgeon-recommended neoadjuvant systemic therapy. Was supposed to have port placement tomorrow (Dr. Nicholson)-abnormal UA with RBC reportedly, so being rescheduled for possibly Friday.  No complaints of dysuria, urinary frequency or pressure, back pain, gross hematuria.  Patient reports she has a long history of microscopic hematuria for which she has had urologic evaluation in the past. No lymph node complaints. No current fevers, cough or SOB. Taking care of father (had a CVA)-he lives in a nearby home.  Patient has 1 daughter whom she says is battling depression and is not of much support.  She does have a sister whom she has notified of her current diagnosis.  She also has a brother who may be of assistance if needed pending course.

## 2024-08-21 ENCOUNTER — NON-APPOINTMENT (OUTPATIENT)
Age: 59
End: 2024-08-21

## 2024-08-21 LAB
ALBUMIN SERPL ELPH-MCNC: 4.9 G/DL
ALP BLD-CCNC: 81 U/L
ALT SERPL-CCNC: 42 U/L
ANION GAP SERPL CALC-SCNC: 13 MMOL/L
AST SERPL-CCNC: 32 U/L
BILIRUB SERPL-MCNC: 1 MG/DL
BUN SERPL-MCNC: 10 MG/DL
CALCIUM SERPL-MCNC: 10.1 MG/DL
CHLORIDE SERPL-SCNC: 105 MMOL/L
CO2 SERPL-SCNC: 28 MMOL/L
CREAT SERPL-MCNC: 0.69 MG/DL
EGFR: 99 ML/MIN/1.73M2
GLUCOSE SERPL-MCNC: 88 MG/DL
HAV IGM SER QL: NONREACTIVE
HBV CORE IGG+IGM SER QL: NONREACTIVE
HBV CORE IGM SER QL: NONREACTIVE
HBV SURFACE AB SER QL: REACTIVE
HBV SURFACE AG SER QL: NONREACTIVE
HBV SURFACE AG SER QL: NONREACTIVE
HCV AB SER QL: NONREACTIVE
HCV S/CO RATIO: 0.07 S/CO
HEPATITIS A IGG ANTIBODY: REACTIVE
INR PPP: 1 RATIO
POTASSIUM SERPL-SCNC: 4.2 MMOL/L
PROT SERPL-MCNC: 7.4 G/DL
PT BLD: 11.4 SEC
SODIUM SERPL-SCNC: 146 MMOL/L

## 2024-08-22 PROBLEM — K75.2 NONSPECIFIC REACTIVE HEPATITIS: Status: ACTIVE | Noted: 2024-08-22

## 2024-08-22 LAB — BACTERIA UR CULT: NORMAL

## 2024-08-26 ENCOUNTER — APPOINTMENT (OUTPATIENT)
Dept: CARDIOLOGY | Facility: CLINIC | Age: 59
End: 2024-08-26
Payer: MEDICAID

## 2024-08-26 PROCEDURE — 93306 TTE W/DOPPLER COMPLETE: CPT

## 2024-08-26 PROCEDURE — 93356 MYOCRD STRAIN IMG SPCKL TRCK: CPT

## 2024-08-27 ENCOUNTER — NON-APPOINTMENT (OUTPATIENT)
Age: 59
End: 2024-08-27

## 2024-09-07 PROBLEM — Z51.11 CHEMOTHERAPY MANAGEMENT, ENCOUNTER FOR: Status: ACTIVE | Noted: 2024-09-07

## 2024-09-07 NOTE — REVIEW OF SYSTEMS
Messged parent to confirm if this 1 form is all they need and if so  will print Immunization Report and Have the Provider sign.  [facility] Coordinator     [Negative] : Allergic/Immunologic

## 2024-09-09 ENCOUNTER — NON-APPOINTMENT (OUTPATIENT)
Age: 59
End: 2024-09-09

## 2024-09-10 ENCOUNTER — RESULT REVIEW (OUTPATIENT)
Age: 59
End: 2024-09-10

## 2024-09-10 ENCOUNTER — APPOINTMENT (OUTPATIENT)
Dept: HEMATOLOGY ONCOLOGY | Facility: CLINIC | Age: 59
End: 2024-09-10
Payer: MEDICAID

## 2024-09-10 ENCOUNTER — APPOINTMENT (OUTPATIENT)
Dept: INFUSION THERAPY | Facility: HOSPITAL | Age: 59
End: 2024-09-10

## 2024-09-10 ENCOUNTER — NON-APPOINTMENT (OUTPATIENT)
Age: 59
End: 2024-09-10

## 2024-09-10 VITALS
RESPIRATION RATE: 16 BRPM | OXYGEN SATURATION: 96 % | DIASTOLIC BLOOD PRESSURE: 76 MMHG | SYSTOLIC BLOOD PRESSURE: 127 MMHG | HEART RATE: 66 BPM | TEMPERATURE: 98.6 F

## 2024-09-10 LAB
ALBUMIN SERPL ELPH-MCNC: 4 G/DL — SIGNIFICANT CHANGE UP (ref 3.3–5)
ALP SERPL-CCNC: 71 U/L — SIGNIFICANT CHANGE UP (ref 40–120)
ALT FLD-CCNC: 45 U/L — SIGNIFICANT CHANGE UP (ref 10–45)
ANION GAP SERPL CALC-SCNC: 9 MMOL/L — SIGNIFICANT CHANGE UP (ref 5–17)
ANISOCYTOSIS BLD QL: SLIGHT — SIGNIFICANT CHANGE UP
AST SERPL-CCNC: 48 U/L — HIGH (ref 10–40)
BASOPHILS # BLD AUTO: 0.09 K/UL — SIGNIFICANT CHANGE UP (ref 0–0.2)
BASOPHILS NFR BLD AUTO: 1 % — SIGNIFICANT CHANGE UP (ref 0–2)
BILIRUB SERPL-MCNC: 0.9 MG/DL — SIGNIFICANT CHANGE UP (ref 0.2–1.2)
BUN SERPL-MCNC: 16 MG/DL — SIGNIFICANT CHANGE UP (ref 7–23)
CALCIUM SERPL-MCNC: 9.5 MG/DL — SIGNIFICANT CHANGE UP (ref 8.4–10.5)
CHLORIDE SERPL-SCNC: 105 MMOL/L — SIGNIFICANT CHANGE UP (ref 96–108)
CO2 SERPL-SCNC: 28 MMOL/L — SIGNIFICANT CHANGE UP (ref 22–31)
CREAT SERPL-MCNC: 0.62 MG/DL — SIGNIFICANT CHANGE UP (ref 0.5–1.3)
DACRYOCYTES BLD QL SMEAR: SLIGHT — SIGNIFICANT CHANGE UP
EGFR: 102 ML/MIN/1.73M2 — SIGNIFICANT CHANGE UP
ELLIPTOCYTES BLD QL SMEAR: SLIGHT — SIGNIFICANT CHANGE UP
EOSINOPHIL # BLD AUTO: 0.19 K/UL — SIGNIFICANT CHANGE UP (ref 0–0.5)
EOSINOPHIL NFR BLD AUTO: 2 % — SIGNIFICANT CHANGE UP (ref 0–6)
GLUCOSE SERPL-MCNC: 94 MG/DL — SIGNIFICANT CHANGE UP (ref 70–99)
HCT VFR BLD CALC: 39.1 % — SIGNIFICANT CHANGE UP (ref 34.5–45)
HGB BLD-MCNC: 12.6 G/DL — SIGNIFICANT CHANGE UP (ref 11.5–15.5)
LYMPHOCYTES # BLD AUTO: 5.33 K/UL — HIGH (ref 1–3.3)
LYMPHOCYTES # BLD AUTO: 57 % — HIGH (ref 13–44)
MCHC RBC-ENTMCNC: 26.1 PG — LOW (ref 27–34)
MCHC RBC-ENTMCNC: 32.2 G/DL — SIGNIFICANT CHANGE UP (ref 32–36)
MCV RBC AUTO: 81 FL — SIGNIFICANT CHANGE UP (ref 80–100)
MONOCYTES # BLD AUTO: 0.75 K/UL — SIGNIFICANT CHANGE UP (ref 0–0.9)
MONOCYTES NFR BLD AUTO: 8 % — SIGNIFICANT CHANGE UP (ref 2–14)
NEUTROPHILS # BLD AUTO: 2.99 K/UL — SIGNIFICANT CHANGE UP (ref 1.8–7.4)
NEUTROPHILS NFR BLD AUTO: 32 % — LOW (ref 43–77)
NRBC # BLD: 0 /100 WBCS — SIGNIFICANT CHANGE UP (ref 0–0)
NRBC # BLD: SIGNIFICANT CHANGE UP /100 WBCS (ref 0–0)
PLAT MORPH BLD: NORMAL — SIGNIFICANT CHANGE UP
PLATELET # BLD AUTO: 192 K/UL — SIGNIFICANT CHANGE UP (ref 150–400)
POIKILOCYTOSIS BLD QL AUTO: SLIGHT — SIGNIFICANT CHANGE UP
POTASSIUM SERPL-MCNC: 3.6 MMOL/L — SIGNIFICANT CHANGE UP (ref 3.5–5.3)
POTASSIUM SERPL-SCNC: 3.6 MMOL/L — SIGNIFICANT CHANGE UP (ref 3.5–5.3)
PROT SERPL-MCNC: 6.7 G/DL — SIGNIFICANT CHANGE UP (ref 6–8.3)
RBC # BLD: 4.83 M/UL — SIGNIFICANT CHANGE UP (ref 3.8–5.2)
RBC # FLD: 14.6 % — HIGH (ref 10.3–14.5)
RBC BLD AUTO: ABNORMAL
SODIUM SERPL-SCNC: 142 MMOL/L — SIGNIFICANT CHANGE UP (ref 135–145)
TSH SERPL-MCNC: 2.15 UIU/ML — SIGNIFICANT CHANGE UP (ref 0.27–4.2)
WBC # BLD: 9.35 K/UL — SIGNIFICANT CHANGE UP (ref 3.8–10.5)
WBC # FLD AUTO: 9.35 K/UL — SIGNIFICANT CHANGE UP (ref 3.8–10.5)

## 2024-09-10 PROCEDURE — 99214 OFFICE O/P EST MOD 30 MIN: CPT

## 2024-09-10 PROCEDURE — G2211 COMPLEX E/M VISIT ADD ON: CPT | Mod: NC

## 2024-09-10 NOTE — ASSESSMENT
[FreeTextEntry1] : Lab work reviewed. #1) History of right breast cancer 6/2006-Clinically Stage IIB (T2N1M0), ER negative/[place] negative/HER-2 sandro negative. Status post neoadjuvant TAC--> right breast conservation surgery-->RT.  #2) ADH left breast-patent was agreeable to breast cancer surveillance (declined chemopreventative therapy).  #3) Now, multifocal right breast cancer: 7/2/20246219-Oukgcywln-gthamdzhfp right breast mammographic mass corresponding to a sonographic mass at 12:00 retroareolar region.  3 additional suspicious right breast sonographic masses. 7/11/2024-US guided Right breast biopsies- #1-12:00, retroareolar-invasive ductal carcinoma, poorly differentiated #2)  6:00, retroareolar-invasive ductal carcinoma, poorly differentiated, ER-/[place]-/Her2-(1+); Ki-67=80%. #3)  10:00, 4 cm FN-invasive ductal carcinoma, poorly differentiated.  DCIS, high nuclear grade, solid pattern, ER-/[place]-/Her2-(1+); Ki-67=80%.  7/26/2024-Breast MRI-multifocal disease within the right breast without evidence of adenopathy (prior LND).  A dominant 4 cm enhancing mass with malignant kinetic curves at the retroareolar region of the right breast at 12:00 and an additional area of enhancement at right breast 10:00 measuring 2.5 cm.  8/12/2024-PET/CT scan-multifocal hypermetabolic neoplasm noted in the right breast.  Multiple hypermetabolic lymph node metastases noted in the right internal mammary chain.  For TNBC, recommend neoadjuvant Keynote 522 protocol. As patient has had adriamycin in the past, need to have cardiology see patient and advise-had discussed case with Dr. Olivarez-patient to see him. --Post neoadjuvant systemic therapy, to re-eval. for surgery (patient considering b/l mastectomies at this point), and then ?RT. --Pending pathologic response to neoadjuvant therapy, further adjuvant therapy decisions to be finalized   --f/u with [facility] cardiology, Dr. Olivarez as planned --clinically stable for treatment today  #4) history of relative lymphocytosis/T-cell clonal population of undetermined significance.  10/2014 Peripheral blood flow cytometry showed no morphologic or immunophenotypic evidence of a lymphoproliferative disorder or presence of blasts.  Smear review showed increase in eosinophils, and borderline increase in lymphocytes, which showed diminished expression of CD 7. These findings could be seen in a T cell lymphoproliferative condition. This may also be seen in inflammatory conditions where the loss is temporary in nature. 3/2016-Peripheral blood T cell gene rearrangement study showed a clonal T-cell receptor gamma population detected. 3/2016- Abdominal ultrasound-unremarkable size of the liver and spleen. 6/2016-T-cell gene clonality study was positive for a clonal T-cell receptor beta population. --have reviewed that some bone marrow disorders/LPD's may evolve over time. In light of chronicity of her relative lymphocytosis and lack of related symptoms, patient has wished for continued lab work surveillance and no BMB.  Will need growth factor support during chemotherapy.  Should her hematologic/clinical scenario change/worsen, may need to reconsider BMB.    #5) 8/26/20247089-kozstdavdgxtsu-VVAF-67%.  Patient was given the opportunity to ask questions. Her questions have been answered to her apparent satisfaction at this time. Case discussed with surgeon Dr. Nicholson 8/20/2024  She reported internal mammary nodes retrosternal with no plans to biopsy.  Will have interval follow-up imaging prior to eventual surgery.   **Case presented at multidisciplinary breast TB conference-consensus in agreement with neoadjuvant therapy-plan Keynote 522 protocol for TNBC. Dr. Olivarez (cardiology) to assist with cardiac recommendations/management with anticipated adriamycin use. May consider adjuvant radiation post eventual surgery per radiation oncology.   -->Plan: Neoadjuvant: Pembrolizumab 200 mg IV day 1, Paclitaxel 80 mg/m IV days 1, 8, 15, Carboplatin with AUC of 1.5 IV days 1, 8, 15.  Cycle is every 21 days x 4 cycles.  This would be followed with Pembrolizumab 200 mg IV day 1, Doxorubicin 60 mg/m IV day 1, Cyclophosphamide 600 mg/m IV day 1 with cycle every 21 days x 4 cycles. Cycle #1 Day#1 Pembrolizumab, Taxol, Carboplatin today.

## 2024-09-10 NOTE — PHYSICAL EXAM
[Fully active, able to carry on all pre-disease performance without restriction] : Status 0 - Fully active, able to carry on all pre-disease performance without restriction [Normal] : affect appropriate [de-identified] : s/p right CW port placement [de-identified] : ~4-5 cm firm mass at ~12:00 right breast above areola; ~2 cm mass at ~6:00 inf. to areola; no dominant left breast mass palpable

## 2024-09-10 NOTE — PHYSICAL EXAM
[Fully active, able to carry on all pre-disease performance without restriction] : Status 0 - Fully active, able to carry on all pre-disease performance without restriction [Normal] : affect appropriate [de-identified] : s/p right CW port placement [de-identified] : ~4-5 cm firm mass at ~12:00 right breast above areola; ~2 cm mass at ~6:00 inf. to areola; no dominant left breast mass palpable

## 2024-09-10 NOTE — HISTORY OF PRESENT ILLNESS
[Disease: _____________________] : Disease: [unfilled] [T: ___] : T[unfilled] [N: ___] : N[unfilled] [M: ___] : M[unfilled] [AJCC Stage: ____] : AJCC Stage: [unfilled] [de-identified] : History of right breast cancer 6/2006-Clinically Stage IIB (T2N1M0), ER negative/[place] negative/HER-2 sandro negative. Status post neoadjuvant TAC--> right breast conservation surgery-->RT.  History of relative increase in lymphocytes , at least dating back to 5/2008. 10/2014 Peripheral blood flow cytometry showed no morphologic or immunophenotypic evidence of a lymphoproliferative disorder or presence of blasts.  Smear review showed increase in eosinophils, and borderline increase in lymphocytes, which showed diminished expression of CD 7. These findings could be seen in a T cell lymphoproliferative condition. This may also be seen in inflammatory conditions where the loss is temporary in nature. 3/2016-Peripheral blood T cell gene rearrangement study showed a clonal T-cell receptor gamma population detected. 3/2016- Abdominal ultrasound-unremarkable size of the liver and spleen. 6/2016-T-cell gene clonality study was positive for a clonal T-cell receptor beta population.  2/2021-Left breast ADH s/p lumpectomy. Patient declined chemopreventative therapy.  7/2/[id number]-Wyseziobj-msutdvzpzf right breast mammographic mass corresponding to a sonographic mass at 12:00 retroareolar region.  3 additional suspicious right breast sonographic masses.  Ultrasound-guided core biopsy is recommended. 7/11/2024-US guided Right breast biopsies- #1-12:00, retroareolar-invasive ductal carcinoma, poorly differentiated #2)  6:00, retroareolar-invasive ductal carcinoma, poorly differentiated, ER-/[place]-/Her2-(1+); Ki-67=80%. #3)  10:00, 4 cm FN-invasive ductal carcinoma, poorly differentiated.  DCIS, high nuclear grade, solid pattern, ER-/[place]-/Her2-(1+); Ki-67=80%.  7/26/2024-Breast MRI-multifocal disease within the right breast without evidence of adenopathy (prior LND).  A dominant 4 cm enhancing mass with malignant kinetic curves at the retroareolar region of the right breast at 12:00 and an additional area of enhancement at right breast 10:00 measuring 2.5 cm.  8/12/2024-PET/CT scan-multifocal hypermetabolic neoplasm noted in the right breast.  Multiple hypermetabolic lymph node metastases noted in the right internal mammary chain. 9/10/2024-Beginning Keynote 522.  [de-identified] : Multifocal Infiltrating duct carcinoma [de-identified] :  For 6:00 and 10:00 lesions: ER- (<1%)/[place]-(<1%)/Her2-(1+); Ki-67=80%. [de-identified] : BRCA 1 sequencing (5 site rearrangement panel) no mutation detected.\par  BRCA 2 sequencing-no mutation detected. [de-identified] : S/P port placement. No current fevers, cough or SOB. Taking care of father (had a CVA)-he lives in a nearby home.  Patient has 1 daughter whom she says is battling depression and is not of much support.  She does have a sister whom she has notified of her current diagnosis.  She also has a brother who may be of assistance if needed pending course.

## 2024-09-10 NOTE — HISTORY OF PRESENT ILLNESS
[Disease: _____________________] : Disease: [unfilled] [T: ___] : T[unfilled] [N: ___] : N[unfilled] [M: ___] : M[unfilled] [AJCC Stage: ____] : AJCC Stage: [unfilled] [de-identified] : History of right breast cancer 6/2006-Clinically Stage IIB (T2N1M0), ER negative/[place] negative/HER-2 sandro negative. Status post neoadjuvant TAC--> right breast conservation surgery-->RT.  History of relative increase in lymphocytes , at least dating back to 5/2008. 10/2014 Peripheral blood flow cytometry showed no morphologic or immunophenotypic evidence of a lymphoproliferative disorder or presence of blasts.  Smear review showed increase in eosinophils, and borderline increase in lymphocytes, which showed diminished expression of CD 7. These findings could be seen in a T cell lymphoproliferative condition. This may also be seen in inflammatory conditions where the loss is temporary in nature. 3/2016-Peripheral blood T cell gene rearrangement study showed a clonal T-cell receptor gamma population detected. 3/2016- Abdominal ultrasound-unremarkable size of the liver and spleen. 6/2016-T-cell gene clonality study was positive for a clonal T-cell receptor beta population.  2/2021-Left breast ADH s/p lumpectomy. Patient declined chemopreventative therapy.  7/2/[id number]-Idagblzrj-esfhievjbq right breast mammographic mass corresponding to a sonographic mass at 12:00 retroareolar region.  3 additional suspicious right breast sonographic masses.  Ultrasound-guided core biopsy is recommended. 7/11/2024-US guided Right breast biopsies- #1-12:00, retroareolar-invasive ductal carcinoma, poorly differentiated #2)  6:00, retroareolar-invasive ductal carcinoma, poorly differentiated, ER-/RI-/Her2-(1+); Ki-67=80%. #3)  10:00, 4 cm FN-invasive ductal carcinoma, poorly differentiated.  DCIS, high nuclear grade, solid pattern, ER-/RI-/Her2-(1+); Ki-67=80%.  7/26/2024-Breast MRI-multifocal disease within the right breast without evidence of adenopathy (prior LND).  A dominant 4 cm enhancing mass with malignant kinetic curves at the retroareolar region of the right breast at 12:00 and an additional area of enhancement at right breast 10:00 measuring 2.5 cm.  8/12/2024-PET/CT scan-multifocal hypermetabolic neoplasm noted in the right breast.  Multiple hypermetabolic lymph node metastases noted in the right internal mammary chain. 9/10/2024-Beginning Keynote 522.  [de-identified] : Multifocal Infiltrating duct carcinoma [de-identified] :  For 6:00 and 10:00 lesions: ER- (<1%)/[place]-(<1%)/Her2-(1+); Ki-67=80%. [de-identified] : BRCA 1 sequencing (5 site rearrangement panel) no mutation detected.\par  BRCA 2 sequencing-no mutation detected. [de-identified] : S/P port placement. No current fevers, cough or SOB. Taking care of father (had a CVA)-he lives in a nearby home.  Patient has 1 daughter whom she says is battling depression and is not of much support.  She does have a sister whom she has notified of her current diagnosis.  She also has a brother who may be of assistance if needed pending course.

## 2024-09-10 NOTE — ASSESSMENT
[FreeTextEntry1] : Lab work reviewed. #1) History of right breast cancer 6/2006-Clinically Stage IIB (T2N1M0), ER negative/[place] negative/HER-2 sandro negative. Status post neoadjuvant TAC--> right breast conservation surgery-->RT.  #2) ADH left breast-patent was agreeable to breast cancer surveillance (declined chemopreventative therapy).  #3) Now, multifocal right breast cancer: 7/2/[id number]-Gbhuiraxt-oxeypdqchh right breast mammographic mass corresponding to a sonographic mass at 12:00 retroareolar region.  3 additional suspicious right breast sonographic masses. 7/11/2024-US guided Right breast biopsies- #1-12:00, retroareolar-invasive ductal carcinoma, poorly differentiated #2)  6:00, retroareolar-invasive ductal carcinoma, poorly differentiated, ER-/[place]-/Her2-(1+); Ki-67=80%. #3)  10:00, 4 cm FN-invasive ductal carcinoma, poorly differentiated.  DCIS, high nuclear grade, solid pattern, ER-/[place]-/Her2-(1+); Ki-67=80%.  7/26/2024-Breast MRI-multifocal disease within the right breast without evidence of adenopathy (prior LND).  A dominant 4 cm enhancing mass with malignant kinetic curves at the retroareolar region of the right breast at 12:00 and an additional area of enhancement at right breast 10:00 measuring 2.5 cm.  8/12/2024-PET/CT scan-multifocal hypermetabolic neoplasm noted in the right breast.  Multiple hypermetabolic lymph node metastases noted in the right internal mammary chain.  For TNBC, recommend neoadjuvant Keynote 522 protocol. As patient has had adriamycin in the past, need to have cardiology see patient and advise-had discussed case with Dr. Olivarez-patient to see him. --Post neoadjuvant systemic therapy, to re-eval. for surgery (patient considering b/l mastectomies at this point), and then ?RT. --Pending pathologic response to neoadjuvant therapy, further adjuvant therapy decisions to be finalized   --f/u with [facility] cardiology, Dr. Olivarez as planned --clinically stable for treatment today  #4) history of relative lymphocytosis/T-cell clonal population of undetermined significance.  10/2014 Peripheral blood flow cytometry showed no morphologic or immunophenotypic evidence of a lymphoproliferative disorder or presence of blasts.  Smear review showed increase in eosinophils, and borderline increase in lymphocytes, which showed diminished expression of CD 7. These findings could be seen in a T cell lymphoproliferative condition. This may also be seen in inflammatory conditions where the loss is temporary in nature. 3/2016-Peripheral blood T cell gene rearrangement study showed a clonal T-cell receptor gamma population detected. 3/2016- Abdominal ultrasound-unremarkable size of the liver and spleen. 6/2016-T-cell gene clonality study was positive for a clonal T-cell receptor beta population. --have reviewed that some bone marrow disorders/LPD's may evolve over time. In light of chronicity of her relative lymphocytosis and lack of related symptoms, patient has wished for continued lab work surveillance and no BMB.  Will need growth factor support during chemotherapy.  Should her hematologic/clinical scenario change/worsen, may need to reconsider BMB.    #5) 8/26/20244921-xtosamxwkrhuya-OVGN-67%.  Patient was given the opportunity to ask questions. Her questions have been answered to her apparent satisfaction at this time. Case discussed with surgeon Dr. Nicholson 8/20/2024  She reported internal mammary nodes retrosternal with no plans to biopsy.  Will have interval follow-up imaging prior to eventual surgery.   **Case presented at multidisciplinary breast TB conference-consensus in agreement with neoadjuvant therapy-plan Keynote 522 protocol for TNBC. Dr. Olivarez (cardiology) to assist with cardiac recommendations/management with anticipated adriamycin use. May consider adjuvant radiation post eventual surgery per radiation oncology.   -->Plan: Neoadjuvant: Pembrolizumab 200 mg IV day 1, Paclitaxel 80 mg/m IV days 1, 8, 15, Carboplatin with AUC of 1.5 IV days 1, 8, 15.  Cycle is every 21 days x 4 cycles.  This would be followed with Pembrolizumab 200 mg IV day 1, Doxorubicin 60 mg/m IV day 1, Cyclophosphamide 600 mg/m IV day 1 with cycle every 21 days x 4 cycles. Cycle #1 Day#1 Pembrolizumab, Taxol, Carboplatin today.

## 2024-09-11 DIAGNOSIS — R11.2 NAUSEA WITH VOMITING, UNSPECIFIED: ICD-10-CM

## 2024-09-11 DIAGNOSIS — C50.919 MALIGNANT NEOPLASM OF UNSPECIFIED SITE OF UNSPECIFIED FEMALE BREAST: ICD-10-CM

## 2024-09-11 DIAGNOSIS — Z51.11 ENCOUNTER FOR ANTINEOPLASTIC CHEMOTHERAPY: ICD-10-CM

## 2024-09-15 ENCOUNTER — NON-APPOINTMENT (OUTPATIENT)
Age: 59
End: 2024-09-15

## 2024-09-16 ENCOUNTER — APPOINTMENT (OUTPATIENT)
Dept: CARDIOLOGY | Facility: CLINIC | Age: 59
End: 2024-09-16
Payer: MEDICAID

## 2024-09-16 VITALS
BODY MASS INDEX: 26.86 KG/M2 | OXYGEN SATURATION: 97 % | HEART RATE: 74 BPM | TEMPERATURE: 97.5 F | WEIGHT: 174.16 LBS | DIASTOLIC BLOOD PRESSURE: 63 MMHG | SYSTOLIC BLOOD PRESSURE: 101 MMHG

## 2024-09-16 DIAGNOSIS — Z86.39 PERSONAL HISTORY OF OTHER ENDOCRINE, NUTRITIONAL AND METABOLIC DISEASE: ICD-10-CM

## 2024-09-16 DIAGNOSIS — R31.21 ASYMPTOMATIC MICROSCOPIC HEMATURIA: ICD-10-CM

## 2024-09-16 DIAGNOSIS — Z87.898 PERSONAL HISTORY OF OTHER SPECIFIED CONDITIONS: ICD-10-CM

## 2024-09-16 DIAGNOSIS — E78.00 PURE HYPERCHOLESTEROLEMIA, UNSPECIFIED: ICD-10-CM

## 2024-09-16 DIAGNOSIS — Z92.89 PERSONAL HISTORY OF OTHER MEDICAL TREATMENT: ICD-10-CM

## 2024-09-16 DIAGNOSIS — Z91.89 OTHER SPECIFIED PERSONAL RISK FACTORS, NOT ELSEWHERE CLASSIFIED: ICD-10-CM

## 2024-09-16 DIAGNOSIS — Z45.2 ENCOUNTER FOR ADJUSTMENT AND MANAGEMENT OF VASCULAR ACCESS DEVICE: ICD-10-CM

## 2024-09-16 DIAGNOSIS — Z51.12 ENCOUNTER FOR ANTINEOPLASTIC IMMUNOTHERAPY: ICD-10-CM

## 2024-09-16 DIAGNOSIS — Z87.448 PERSONAL HISTORY OF OTHER DISEASES OF URINARY SYSTEM: ICD-10-CM

## 2024-09-16 PROCEDURE — 99205 OFFICE O/P NEW HI 60 MIN: CPT | Mod: 25

## 2024-09-16 PROCEDURE — 93000 ELECTROCARDIOGRAM COMPLETE: CPT

## 2024-09-16 PROCEDURE — 93010 ELECTROCARDIOGRAM REPORT: CPT

## 2024-09-16 PROCEDURE — G2211 COMPLEX E/M VISIT ADD ON: CPT | Mod: NC

## 2024-09-16 RX ORDER — ROSUVASTATIN CALCIUM 40 MG/1
40 TABLET, FILM COATED ORAL DAILY
Qty: 90 | Refills: 3 | Status: ACTIVE | COMMUNITY
Start: 2024-09-16

## 2024-09-16 RX ORDER — CARVEDILOL 3.12 MG/1
3.12 TABLET, FILM COATED ORAL
Qty: 180 | Refills: 2 | Status: ACTIVE | COMMUNITY
Start: 2024-09-16 | End: 1900-01-01

## 2024-09-17 ENCOUNTER — RESULT REVIEW (OUTPATIENT)
Age: 59
End: 2024-09-17

## 2024-09-17 ENCOUNTER — APPOINTMENT (OUTPATIENT)
Dept: INFUSION THERAPY | Facility: HOSPITAL | Age: 59
End: 2024-09-17

## 2024-09-17 ENCOUNTER — APPOINTMENT (OUTPATIENT)
Dept: HEMATOLOGY ONCOLOGY | Facility: CLINIC | Age: 59
End: 2024-09-17
Payer: MEDICAID

## 2024-09-17 VITALS
RESPIRATION RATE: 16 BRPM | OXYGEN SATURATION: 100 % | DIASTOLIC BLOOD PRESSURE: 73 MMHG | SYSTOLIC BLOOD PRESSURE: 117 MMHG | HEART RATE: 63 BPM | TEMPERATURE: 98.3 F

## 2024-09-17 PROBLEM — R31.9 HEMATURIA, UNSPECIFIED: Chronic | Status: ACTIVE | Noted: 2024-09-09

## 2024-09-17 PROBLEM — S62.327D: Chronic | Status: ACTIVE | Noted: 2024-09-09

## 2024-09-17 PROBLEM — R11.2 NAUSEA WITH VOMITING, UNSPECIFIED: Chronic | Status: ACTIVE | Noted: 2024-09-09

## 2024-09-17 PROBLEM — R94.2 ABNORMAL RESULTS OF PULMONARY FUNCTION STUDIES: Chronic | Status: ACTIVE | Noted: 2024-09-09

## 2024-09-17 PROBLEM — C50.911 MALIGNANT NEOPLASM OF UNSPECIFIED SITE OF RIGHT FEMALE BREAST: Chronic | Status: ACTIVE | Noted: 2024-09-09

## 2024-09-17 PROBLEM — E78.00 PURE HYPERCHOLESTEROLEMIA, UNSPECIFIED: Chronic | Status: ACTIVE | Noted: 2024-09-09

## 2024-09-17 PROBLEM — Z86.19 PERSONAL HISTORY OF OTHER INFECTIOUS AND PARASITIC DISEASES: Chronic | Status: ACTIVE | Noted: 2024-09-09

## 2024-09-17 PROBLEM — Z45.2 ENCOUNTER FOR ADJUSTMENT AND MANAGEMENT OF VASCULAR ACCESS DEVICE: Chronic | Status: ACTIVE | Noted: 2024-09-09

## 2024-09-17 PROBLEM — B97.7 PAPILLOMAVIRUS AS THE CAUSE OF DISEASES CLASSIFIED ELSEWHERE: Chronic | Status: ACTIVE | Noted: 2024-09-09

## 2024-09-17 LAB
ALBUMIN SERPL ELPH-MCNC: 4 G/DL — SIGNIFICANT CHANGE UP (ref 3.3–5)
ALP SERPL-CCNC: 71 U/L — SIGNIFICANT CHANGE UP (ref 40–120)
ALT FLD-CCNC: 45 U/L — SIGNIFICANT CHANGE UP (ref 10–45)
ANION GAP SERPL CALC-SCNC: 8 MMOL/L — SIGNIFICANT CHANGE UP (ref 5–17)
AST SERPL-CCNC: 39 U/L — SIGNIFICANT CHANGE UP (ref 10–40)
BASOPHILS # BLD AUTO: 0.04 K/UL — SIGNIFICANT CHANGE UP (ref 0–0.2)
BASOPHILS NFR BLD AUTO: 0.8 % — SIGNIFICANT CHANGE UP (ref 0–2)
BILIRUB SERPL-MCNC: 1 MG/DL — SIGNIFICANT CHANGE UP (ref 0.2–1.2)
BUN SERPL-MCNC: 13 MG/DL — SIGNIFICANT CHANGE UP (ref 7–23)
CALCIUM SERPL-MCNC: 9.3 MG/DL — SIGNIFICANT CHANGE UP (ref 8.4–10.5)
CHLORIDE SERPL-SCNC: 105 MMOL/L — SIGNIFICANT CHANGE UP (ref 96–108)
CO2 SERPL-SCNC: 28 MMOL/L — SIGNIFICANT CHANGE UP (ref 22–31)
CREAT SERPL-MCNC: 0.55 MG/DL — SIGNIFICANT CHANGE UP (ref 0.5–1.3)
EGFR: 104 ML/MIN/1.73M2 — SIGNIFICANT CHANGE UP
EOSINOPHIL # BLD AUTO: 0.19 K/UL — SIGNIFICANT CHANGE UP (ref 0–0.5)
EOSINOPHIL NFR BLD AUTO: 3.9 % — SIGNIFICANT CHANGE UP (ref 0–6)
GLUCOSE SERPL-MCNC: 114 MG/DL — HIGH (ref 70–99)
HCT VFR BLD CALC: 37.1 % — SIGNIFICANT CHANGE UP (ref 34.5–45)
HGB BLD-MCNC: 11.8 G/DL — SIGNIFICANT CHANGE UP (ref 11.5–15.5)
IMM GRANULOCYTES NFR BLD AUTO: 0.4 % — SIGNIFICANT CHANGE UP (ref 0–0.9)
LYMPHOCYTES # BLD AUTO: 2.92 K/UL — SIGNIFICANT CHANGE UP (ref 1–3.3)
LYMPHOCYTES # BLD AUTO: 60.5 % — HIGH (ref 13–44)
MCHC RBC-ENTMCNC: 26 PG — LOW (ref 27–34)
MCHC RBC-ENTMCNC: 31.8 G/DL — LOW (ref 32–36)
MCV RBC AUTO: 81.9 FL — SIGNIFICANT CHANGE UP (ref 80–100)
MONOCYTES # BLD AUTO: 0.18 K/UL — SIGNIFICANT CHANGE UP (ref 0–0.9)
MONOCYTES NFR BLD AUTO: 3.7 % — SIGNIFICANT CHANGE UP (ref 2–14)
NEUTROPHILS # BLD AUTO: 1.48 K/UL — LOW (ref 1.8–7.4)
NEUTROPHILS NFR BLD AUTO: 30.7 % — LOW (ref 43–77)
NRBC # BLD: 0 /100 WBCS — SIGNIFICANT CHANGE UP (ref 0–0)
PLATELET # BLD AUTO: 200 K/UL — SIGNIFICANT CHANGE UP (ref 150–400)
POTASSIUM SERPL-MCNC: 3.8 MMOL/L — SIGNIFICANT CHANGE UP (ref 3.5–5.3)
POTASSIUM SERPL-SCNC: 3.8 MMOL/L — SIGNIFICANT CHANGE UP (ref 3.5–5.3)
PROT SERPL-MCNC: 6.7 G/DL — SIGNIFICANT CHANGE UP (ref 6–8.3)
RBC # BLD: 4.53 M/UL — SIGNIFICANT CHANGE UP (ref 3.8–5.2)
RBC # FLD: 14.5 % — SIGNIFICANT CHANGE UP (ref 10.3–14.5)
SODIUM SERPL-SCNC: 141 MMOL/L — SIGNIFICANT CHANGE UP (ref 135–145)
WBC # BLD: 4.83 K/UL — SIGNIFICANT CHANGE UP (ref 3.8–10.5)
WBC # FLD AUTO: 4.83 K/UL — SIGNIFICANT CHANGE UP (ref 3.8–10.5)

## 2024-09-17 PROCEDURE — G2211 COMPLEX E/M VISIT ADD ON: CPT | Mod: NC

## 2024-09-17 PROCEDURE — 99214 OFFICE O/P EST MOD 30 MIN: CPT

## 2024-09-17 NOTE — PHYSICAL EXAM
[Fully active, able to carry on all pre-disease performance without restriction] : Status 0 - Fully active, able to carry on all pre-disease performance without restriction [Normal] : affect appropriate [de-identified] : s/p right CW port placement [de-identified] : ~4-5 cm firm mass at ~12:00 right breast above areola feels much softer, less discrete; ~2 cm mass at ~6:00 inf. to areola; no dominant left breast mass palpable

## 2024-09-17 NOTE — ASSESSMENT
[FreeTextEntry1] : Lab work, 9/16/24 cardiology note reviewed. #1) History of right breast cancer 6/2006-Clinically Stage IIB (T2N1M0), ER negative/HI negative/HER-2 sandro negative. Status post neoadjuvant TAC--> right breast conservation surgery-->RT.  #2) ADH left breast-patent was agreeable to breast cancer surveillance (declined chemopreventative therapy).  #3) Now, multifocal right breast cancer: 7/2/20246675-Wyrijplaf-vgxsiccjfs right breast mammographic mass corresponding to a sonographic mass at 12:00 retroareolar region.  3 additional suspicious right breast sonographic masses. 7/11/2024-US guided Right breast biopsies- #1-12:00, retroareolar-invasive ductal carcinoma, poorly differentiated, ER low+(1-10%)/HI-/Her2-(1+); Ki-67=80%. #2)  6:00, retroareolar-invasive ductal carcinoma, poorly differentiated, ER-/HI-/Her2-(1+); Ki-67=80%. #3)  10:00, 4 cm FN-invasive ductal carcinoma, poorly differentiated.  DCIS, high nuclear grade, solid pattern, ER-/HI-/Her2-(1+); Ki-67=80%.  7/26/2024-Breast MRI-multifocal disease within the right breast without evidence of adenopathy (prior LND).  A dominant 4 cm enhancing mass with malignant kinetic curves at the retroareolar region of the right breast at 12:00 and an additional area of enhancement at right breast 10:00 measuring 2.5 cm.  8/12/2024-PET/CT scan-multifocal hypermetabolic neoplasm noted in the right breast.  Multiple hypermetabolic lymph node metastases noted in the right internal mammary chain.  For TNBC, recommend neoadjuvant Keynote 522 protocol. As patient has had adriamycin in the past, need to have cardiology continue to follow-appreciated Dr. Olivarez's input. --Post neoadjuvant systemic therapy, to re-eval. for surgery (patient considering b/l mastectomies at this point), and then ?RT. --Pending pathologic response to neoadjuvant therapy, further adjuvant therapy decisions to be finalized   --continue f/u with [facility] cardiology, Dr. Olivarez --clinically stable for treatment today to which patient consents, though will give zarxio support with current ANC (note has h/o relative lymphocytosis pre-dating chemo).   #4) history of relative lymphocytosis/T-cell clonal population of undetermined significance.  10/2014 Peripheral blood flow cytometry showed no morphologic or immunophenotypic evidence of a lymphoproliferative disorder or presence of blasts.  Smear review showed increase in eosinophils, and borderline increase in lymphocytes, which showed diminished expression of CD 7. These findings could be seen in a T cell lymphoproliferative condition. This may also be seen in inflammatory conditions where the loss is temporary in nature. 3/2016-Peripheral blood T cell gene rearrangement study showed a clonal T-cell receptor gamma population detected. 3/2016- Abdominal ultrasound-unremarkable size of the liver and spleen. 6/2016-T-cell gene clonality study was positive for a clonal T-cell receptor beta population. --have reviewed that some bone marrow disorders/LPD's may evolve over time. In light of chronicity of her relative lymphocytosis and lack of related symptoms, patient has wished for continued lab work surveillance and no BMB.  Will need growth factor support during chemotherapy as above.   --Should her hematologic/clinical scenario change/worsen, may need to reconsider BMB.    #5) 8/26/20248521-xhnwbsgjrjobkr-DNIX-67%.  Patient was given the opportunity to ask questions. Her questions have been answered to her apparent satisfaction at this time. Case discussed with surgeon Dr. Nicholson 8/20/2024  She reported internal mammary nodes retrosternal with no plans to biopsy.  Will have interval follow-up imaging prior to eventual surgery.   **Case presented at multidisciplinary breast TB conference-consensus in agreement with neoadjuvant therapy-plan Keynote 522 protocol for TNBC. Dr. Olivarez (cardiology) to assist with cardiac recommendations/management with anticipated adriamycin use. May consider adjuvant radiation post eventual surgery per radiation oncology.   -->Plan: Neoadjuvant: Pembrolizumab 200 mg IV day 1, Paclitaxel 80 mg/m IV days 1, 8, 15, Carboplatin with AUC of 1.5 IV days 1, 8, 15.  Cycle is every 21 days x 4 cycles.  This would be followed with Pembrolizumab 200 mg IV day 1, Doxorubicin 60 mg/m IV day 1, Cyclophosphamide 600 mg/m IV day 1 with cycle every 21 days x 4 cycles. Cycle #1 Day# 8 Taxol, Carboplatin today. Zarxio 480 mcg SQ daily x 3 days post chemo.

## 2024-09-17 NOTE — HISTORY OF PRESENT ILLNESS
[Disease: _____________________] : Disease: [unfilled] [T: ___] : T[unfilled] [N: ___] : N[unfilled] [M: ___] : M[unfilled] [AJCC Stage: ____] : AJCC Stage: [unfilled] [de-identified] : History of right breast cancer 6/2006-Clinically Stage IIB (T2N1M0), ER negative/N[place] negative/HER-2 sandro negative. Status post neoadjuvant TAC--> right breast conservation surgery-->RT.  History of relative increase in lymphocytes , at least dating back to 5/2008. 10/2014 Peripheral blood flow cytometry showed no morphologic or immunophenotypic evidence of a lymphoproliferative disorder or presence of blasts.  Smear review showed increase in eosinophils, and borderline increase in lymphocytes, which showed diminished expression of CD 7. These findings could be seen in a T cell lymphoproliferative condition. This may also be seen in inflammatory conditions where the loss is temporary in nature. 3/2016-Peripheral blood T cell gene rearrangement study showed a clonal T-cell receptor gamma population detected. 3/2016- Abdominal ultrasound-unremarkable size of the liver and spleen. 6/2016-T-cell gene clonality study was positive for a clonal T-cell receptor beta population.  2/2021-Left breast ADH s/p lumpectomy. Patient declined chemopreventative therapy.  7/2/20241369-Iyynjbjqv-ghrruppqar right breast mammographic mass corresponding to a sonographic mass at 12:00 retroareolar region.  3 additional suspicious right breast sonographic masses.  Ultrasound-guided core biopsy is recommended. 7/11/2024-US guided Right breast biopsies- #1-12:00, retroareolar-invasive ductal carcinoma, poorly differentiated, ER low+(1-10%)/NJ-/Her2-(1+); Ki-67=80%. #2)  6:00, retroareolar-invasive ductal carcinoma, poorly differentiated, ER-/N[place]-/Her2-(1+); Ki-67=80%. #3)  10:00, 4 cm FN-invasive ductal carcinoma, poorly differentiated.  DCIS, high nuclear grade, solid pattern, ER-/N[place]-/Her2-(1+); Ki-67=80%.  7/26/2024-Breast MRI-multifocal disease within the right breast without evidence of adenopathy (prior LND).  A dominant 4 cm enhancing mass with malignant kinetic curves at the retroareolar region of the right breast at 12:00 and an additional area of enhancement at right breast 10:00 measuring 2.5 cm.  8/12/2024-PET/CT scan-multifocal hypermetabolic neoplasm noted in the right breast.  Multiple hypermetabolic lymph node metastases noted in the right internal mammary chain. 9/10/2024-Beginning Keynote 522.  [de-identified] :  For 6:00 and 10:00 lesions: ER- (<1%)/PA-(<1%)/Her2-(1+); Ki-67=80%. [de-identified] : Multifocal Infiltrating duct carcinoma [de-identified] : BRCA 1 sequencing (5 site rearrangement panel) no mutation detected.\par  BRCA 2 sequencing-no mutation detected. [de-identified] : Bony aches post last treatment, now better. Feels right breast mass a little smaller. No current fevers, cough or SOB.  Taking care of father (had a CVA)-he lives in a nearby home.  Patient has 1 daughter whom she says is battling depression and is not of much support.  She does have a sister whom she has notified of her current diagnosis.  She also has a brother who may be of assistance if needed pending course.

## 2024-09-17 NOTE — PHYSICAL EXAM
[Fully active, able to carry on all pre-disease performance without restriction] : Status 0 - Fully active, able to carry on all pre-disease performance without restriction [Normal] : affect appropriate [de-identified] : s/p right CW port placement [de-identified] : ~4-5 cm firm mass at ~12:00 right breast above areola feels much softer, less discrete; ~2 cm mass at ~6:00 inf. to areola; no dominant left breast mass palpable

## 2024-09-17 NOTE — ASSESSMENT
[FreeTextEntry1] : Lab work, 9/16/24 cardiology note reviewed. #1) History of right breast cancer 6/2006-Clinically Stage IIB (T2N1M0), ER negative/VT negative/HER-2 sandro negative. Status post neoadjuvant TAC--> right breast conservation surgery-->RT.  #2) ADH left breast-patent was agreeable to breast cancer surveillance (declined chemopreventative therapy).  #3) Now, multifocal right breast cancer: 7/2/20241500-Bfbcetjfv-fkfyynhomq right breast mammographic mass corresponding to a sonographic mass at 12:00 retroareolar region.  3 additional suspicious right breast sonographic masses. 7/11/2024-US guided Right breast biopsies- #1-12:00, retroareolar-invasive ductal carcinoma, poorly differentiated, ER low+(1-10%)/VT-/Her2-(1+); Ki-67=80%. #2)  6:00, retroareolar-invasive ductal carcinoma, poorly differentiated, ER-/VT-/Her2-(1+); Ki-67=80%. #3)  10:00, 4 cm FN-invasive ductal carcinoma, poorly differentiated.  DCIS, high nuclear grade, solid pattern, ER-/VT-/Her2-(1+); Ki-67=80%.  7/26/2024-Breast MRI-multifocal disease within the right breast without evidence of adenopathy (prior LND).  A dominant 4 cm enhancing mass with malignant kinetic curves at the retroareolar region of the right breast at 12:00 and an additional area of enhancement at right breast 10:00 measuring 2.5 cm.  8/12/2024-PET/CT scan-multifocal hypermetabolic neoplasm noted in the right breast.  Multiple hypermetabolic lymph node metastases noted in the right internal mammary chain.  For TNBC, recommend neoadjuvant Keynote 522 protocol. As patient has had adriamycin in the past, need to have cardiology continue to follow-appreciated Dr. Olivarez's input. --Post neoadjuvant systemic therapy, to re-eval. for surgery (patient considering b/l mastectomies at this point), and then ?RT. --Pending pathologic response to neoadjuvant therapy, further adjuvant therapy decisions to be finalized   --continue f/u with [facility] cardiology, Dr. Olivarez --clinically stable for treatment today to which patient consents, though will give zarxio support with current ANC (note has h/o relative lymphocytosis pre-dating chemo).   #4) history of relative lymphocytosis/T-cell clonal population of undetermined significance.  10/2014 Peripheral blood flow cytometry showed no morphologic or immunophenotypic evidence of a lymphoproliferative disorder or presence of blasts.  Smear review showed increase in eosinophils, and borderline increase in lymphocytes, which showed diminished expression of CD 7. These findings could be seen in a T cell lymphoproliferative condition. This may also be seen in inflammatory conditions where the loss is temporary in nature. 3/2016-Peripheral blood T cell gene rearrangement study showed a clonal T-cell receptor gamma population detected. 3/2016- Abdominal ultrasound-unremarkable size of the liver and spleen. 6/2016-T-cell gene clonality study was positive for a clonal T-cell receptor beta population. --have reviewed that some bone marrow disorders/LPD's may evolve over time. In light of chronicity of her relative lymphocytosis and lack of related symptoms, patient has wished for continued lab work surveillance and no BMB.  Will need growth factor support during chemotherapy as above.   --Should her hematologic/clinical scenario change/worsen, may need to reconsider BMB.    #5) 8/26/[id number]-iszoicmllljvkb-PZYO-67%.  Patient was given the opportunity to ask questions. Her questions have been answered to her apparent satisfaction at this time. Case discussed with surgeon Dr. Nicholson 8/20/2024  She reported internal mammary nodes retrosternal with no plans to biopsy.  Will have interval follow-up imaging prior to eventual surgery.   **Case presented at multidisciplinary breast TB conference-consensus in agreement with neoadjuvant therapy-plan Keynote 522 protocol for TNBC. Dr. Olivarez (cardiology) to assist with cardiac recommendations/management with anticipated adriamycin use. May consider adjuvant radiation post eventual surgery per radiation oncology.   -->Plan: Neoadjuvant: Pembrolizumab 200 mg IV day 1, Paclitaxel 80 mg/m IV days 1, 8, 15, Carboplatin with AUC of 1.5 IV days 1, 8, 15.  Cycle is every 21 days x 4 cycles.  This would be followed with Pembrolizumab 200 mg IV day 1, Doxorubicin 60 mg/m IV day 1, Cyclophosphamide 600 mg/m IV day 1 with cycle every 21 days x 4 cycles. Cycle #1 Day# 8 Taxol, Carboplatin today. Zarxio 480 mcg SQ daily x 3 days post chemo.

## 2024-09-17 NOTE — HISTORY OF PRESENT ILLNESS
[Disease: _____________________] : Disease: [unfilled] [T: ___] : T[unfilled] [N: ___] : N[unfilled] [M: ___] : M[unfilled] [AJCC Stage: ____] : AJCC Stage: [unfilled] [de-identified] : History of right breast cancer 6/2006-Clinically Stage IIB (T2N1M0), ER negative/RI negative/HER-2 sandro negative. Status post neoadjuvant TAC--> right breast conservation surgery-->RT.  History of relative increase in lymphocytes , at least dating back to 5/2008. 10/2014 Peripheral blood flow cytometry showed no morphologic or immunophenotypic evidence of a lymphoproliferative disorder or presence of blasts.  Smear review showed increase in eosinophils, and borderline increase in lymphocytes, which showed diminished expression of CD 7. These findings could be seen in a T cell lymphoproliferative condition. This may also be seen in inflammatory conditions where the loss is temporary in nature. 3/2016-Peripheral blood T cell gene rearrangement study showed a clonal T-cell receptor gamma population detected. 3/2016- Abdominal ultrasound-unremarkable size of the liver and spleen. 6/2016-T-cell gene clonality study was positive for a clonal T-cell receptor beta population.  2/2021-Left breast ADH s/p lumpectomy. Patient declined chemopreventative therapy.  7/2/[id number]-Zljcuprbm-cvovxqcwov right breast mammographic mass corresponding to a sonographic mass at 12:00 retroareolar region.  3 additional suspicious right breast sonographic masses.  Ultrasound-guided core biopsy is recommended. 7/11/2024-US guided Right breast biopsies- #1-12:00, retroareolar-invasive ductal carcinoma, poorly differentiated, ER low+(1-10%)/RI-/Her2-(1+); Ki-67=80%. #2)  6:00, retroareolar-invasive ductal carcinoma, poorly differentiated, ER-/RI-/Her2-(1+); Ki-67=80%. #3)  10:00, 4 cm FN-invasive ductal carcinoma, poorly differentiated.  DCIS, high nuclear grade, solid pattern, ER-/RI-/Her2-(1+); Ki-67=80%.  7/26/2024-Breast MRI-multifocal disease within the right breast without evidence of adenopathy (prior LND).  A dominant 4 cm enhancing mass with malignant kinetic curves at the retroareolar region of the right breast at 12:00 and an additional area of enhancement at right breast 10:00 measuring 2.5 cm.  8/12/2024-PET/CT scan-multifocal hypermetabolic neoplasm noted in the right breast.  Multiple hypermetabolic lymph node metastases noted in the right internal mammary chain. 9/10/2024-Beginning Keynote 522.  [de-identified] :  For 6:00 and 10:00 lesions: ER- (<1%)/AL-(<1%)/Her2-(1+); Ki-67=80%. [de-identified] : Multifocal Infiltrating duct carcinoma [de-identified] : BRCA 1 sequencing (5 site rearrangement panel) no mutation detected.\par  BRCA 2 sequencing-no mutation detected. [de-identified] : Bony aches post last treatment, now better. Feels right breast mass a little smaller. No current fevers, cough or SOB.  Taking care of father (had a CVA)-he lives in a nearby home.  Patient has 1 daughter whom she says is battling depression and is not of much support.  She does have a sister whom she has notified of her current diagnosis.  She also has a brother who may be of assistance if needed pending course.

## 2024-09-18 ENCOUNTER — APPOINTMENT (OUTPATIENT)
Dept: INFUSION THERAPY | Facility: HOSPITAL | Age: 59
End: 2024-09-18

## 2024-09-19 ENCOUNTER — APPOINTMENT (OUTPATIENT)
Dept: INFUSION THERAPY | Facility: HOSPITAL | Age: 59
End: 2024-09-19

## 2024-09-19 DIAGNOSIS — D70.2 OTHER DRUG-INDUCED AGRANULOCYTOSIS: ICD-10-CM

## 2024-09-19 DIAGNOSIS — Z51.89 ENCOUNTER FOR OTHER SPECIFIED AFTERCARE: ICD-10-CM

## 2024-09-20 ENCOUNTER — APPOINTMENT (OUTPATIENT)
Dept: INFUSION THERAPY | Facility: HOSPITAL | Age: 59
End: 2024-09-20

## 2024-09-20 ENCOUNTER — NON-APPOINTMENT (OUTPATIENT)
Age: 59
End: 2024-09-20

## 2024-09-24 ENCOUNTER — RESULT REVIEW (OUTPATIENT)
Age: 59
End: 2024-09-24

## 2024-09-24 ENCOUNTER — NON-APPOINTMENT (OUTPATIENT)
Age: 59
End: 2024-09-24

## 2024-09-24 ENCOUNTER — APPOINTMENT (OUTPATIENT)
Dept: HEMATOLOGY ONCOLOGY | Facility: CLINIC | Age: 59
End: 2024-09-24
Payer: MEDICAID

## 2024-09-24 ENCOUNTER — APPOINTMENT (OUTPATIENT)
Dept: INFUSION THERAPY | Facility: HOSPITAL | Age: 59
End: 2024-09-24

## 2024-09-24 VITALS
SYSTOLIC BLOOD PRESSURE: 120 MMHG | RESPIRATION RATE: 16 BRPM | HEART RATE: 57 BPM | DIASTOLIC BLOOD PRESSURE: 70 MMHG | TEMPERATURE: 98 F | OXYGEN SATURATION: 98 %

## 2024-09-24 LAB
ACANTHOCYTES BLD QL SMEAR: SLIGHT — SIGNIFICANT CHANGE UP
ALBUMIN SERPL ELPH-MCNC: 4 G/DL — SIGNIFICANT CHANGE UP (ref 3.3–5)
ALP SERPL-CCNC: 93 U/L — SIGNIFICANT CHANGE UP (ref 40–120)
ALT FLD-CCNC: 38 U/L — SIGNIFICANT CHANGE UP (ref 10–45)
ANION GAP SERPL CALC-SCNC: 13 MMOL/L — SIGNIFICANT CHANGE UP (ref 5–17)
ANISOCYTOSIS BLD QL: SLIGHT — SIGNIFICANT CHANGE UP
AST SERPL-CCNC: 27 U/L — SIGNIFICANT CHANGE UP (ref 10–40)
BASOPHILS # BLD AUTO: 0.04 K/UL — SIGNIFICANT CHANGE UP (ref 0–0.2)
BASOPHILS NFR BLD AUTO: 0.4 % — SIGNIFICANT CHANGE UP (ref 0–2)
BILIRUB SERPL-MCNC: 0.4 MG/DL — SIGNIFICANT CHANGE UP (ref 0.2–1.2)
BUN SERPL-MCNC: 11 MG/DL — SIGNIFICANT CHANGE UP (ref 7–23)
CALCIUM SERPL-MCNC: 9.5 MG/DL — SIGNIFICANT CHANGE UP (ref 8.4–10.5)
CHLORIDE SERPL-SCNC: 105 MMOL/L — SIGNIFICANT CHANGE UP (ref 96–108)
CO2 SERPL-SCNC: 24 MMOL/L — SIGNIFICANT CHANGE UP (ref 22–31)
CREAT SERPL-MCNC: 0.6 MG/DL — SIGNIFICANT CHANGE UP (ref 0.5–1.3)
EGFR: 102 ML/MIN/1.73M2 — SIGNIFICANT CHANGE UP
ELLIPTOCYTES BLD QL SMEAR: SLIGHT — SIGNIFICANT CHANGE UP
EOSINOPHIL # BLD AUTO: 0.16 K/UL — SIGNIFICANT CHANGE UP (ref 0–0.5)
EOSINOPHIL NFR BLD AUTO: 1.7 % — SIGNIFICANT CHANGE UP (ref 0–6)
GLUCOSE SERPL-MCNC: 118 MG/DL — HIGH (ref 70–99)
HCT VFR BLD CALC: 36 % — SIGNIFICANT CHANGE UP (ref 34.5–45)
HGB BLD-MCNC: 11.6 G/DL — SIGNIFICANT CHANGE UP (ref 11.5–15.5)
IMM GRANULOCYTES NFR BLD AUTO: 4.6 % — HIGH (ref 0–0.9)
LYMPHOCYTES # BLD AUTO: 4.4 K/UL — HIGH (ref 1–3.3)
LYMPHOCYTES # BLD AUTO: 46.7 % — HIGH (ref 13–44)
MAGNESIUM SERPL-MCNC: 2.3 MG/DL — SIGNIFICANT CHANGE UP (ref 1.6–2.6)
MCHC RBC-ENTMCNC: 26.2 PG — LOW (ref 27–34)
MCHC RBC-ENTMCNC: 32.2 G/DL — SIGNIFICANT CHANGE UP (ref 32–36)
MCV RBC AUTO: 81.4 FL — SIGNIFICANT CHANGE UP (ref 80–100)
MONOCYTES # BLD AUTO: 0.81 K/UL — SIGNIFICANT CHANGE UP (ref 0–0.9)
MONOCYTES NFR BLD AUTO: 8.6 % — SIGNIFICANT CHANGE UP (ref 2–14)
NEUTROPHILS # BLD AUTO: 3.58 K/UL — SIGNIFICANT CHANGE UP (ref 1.8–7.4)
NEUTROPHILS NFR BLD AUTO: 38 % — LOW (ref 43–77)
NRBC # BLD: 0 /100 WBCS — SIGNIFICANT CHANGE UP (ref 0–0)
PLAT MORPH BLD: NORMAL — SIGNIFICANT CHANGE UP
PLATELET # BLD AUTO: 212 K/UL — SIGNIFICANT CHANGE UP (ref 150–400)
POIKILOCYTOSIS BLD QL AUTO: SLIGHT — SIGNIFICANT CHANGE UP
POTASSIUM SERPL-MCNC: 3.7 MMOL/L — SIGNIFICANT CHANGE UP (ref 3.5–5.3)
POTASSIUM SERPL-SCNC: 3.7 MMOL/L — SIGNIFICANT CHANGE UP (ref 3.5–5.3)
PROT SERPL-MCNC: 6.7 G/DL — SIGNIFICANT CHANGE UP (ref 6–8.3)
RBC # BLD: 4.42 M/UL — SIGNIFICANT CHANGE UP (ref 3.8–5.2)
RBC # FLD: 14.8 % — HIGH (ref 10.3–14.5)
RBC BLD AUTO: ABNORMAL
SMUDGE CELLS # BLD: PRESENT — SIGNIFICANT CHANGE UP
SODIUM SERPL-SCNC: 141 MMOL/L — SIGNIFICANT CHANGE UP (ref 135–145)
WBC # BLD: 9.42 K/UL — SIGNIFICANT CHANGE UP (ref 3.8–10.5)
WBC # FLD AUTO: 9.42 K/UL — SIGNIFICANT CHANGE UP (ref 3.8–10.5)

## 2024-09-24 PROCEDURE — G2211 COMPLEX E/M VISIT ADD ON: CPT | Mod: NC

## 2024-09-24 PROCEDURE — 99214 OFFICE O/P EST MOD 30 MIN: CPT

## 2024-09-24 RX ORDER — FILGRASTIM-SNDZ 480 UG/.8ML
480 INJECTION, SOLUTION INTRAVENOUS; SUBCUTANEOUS
Qty: 3 | Refills: 0 | Status: DISCONTINUED | COMMUNITY
Start: 2024-09-24 | End: 2024-09-24

## 2024-09-24 NOTE — HISTORY OF PRESENT ILLNESS
[Disease: _____________________] : Disease: [unfilled] [T: ___] : T[unfilled] [N: ___] : N[unfilled] [M: ___] : M[unfilled] [AJCC Stage: ____] : AJCC Stage: [unfilled] [de-identified] : History of right breast cancer 6/2006-Clinically Stage IIB (T2N1M0), ER negative/AL negative/HER-2 sandro negative. Status post neoadjuvant TAC--> right breast conservation surgery-->RT.  History of relative increase in lymphocytes , at least dating back to 5/2008. 10/2014 Peripheral blood flow cytometry showed no morphologic or immunophenotypic evidence of a lymphoproliferative disorder or presence of blasts.  Smear review showed increase in eosinophils, and borderline increase in lymphocytes, which showed diminished expression of CD 7. These findings could be seen in a T cell lymphoproliferative condition. This may also be seen in inflammatory conditions where the loss is temporary in nature. 3/2016-Peripheral blood T cell gene rearrangement study showed a clonal T-cell receptor gamma population detected. 3/2016- Abdominal ultrasound-unremarkable size of the liver and spleen. 6/2016-T-cell gene clonality study was positive for a clonal T-cell receptor beta population.  2/2021-Left breast ADH s/p lumpectomy. Patient declined chemopreventative therapy.  7/2/20246619-Kforxlqrm-efcbqboijj right breast mammographic mass corresponding to a sonographic mass at 12:00 retroareolar region.  3 additional suspicious right breast sonographic masses.  Ultrasound-guided core biopsy is recommended. 7/11/2024-US guided Right breast biopsies- #1-12:00, retroareolar-invasive ductal carcinoma, poorly differentiated, ER low+(1-10%)/AL-/Her2-(1+); Ki-67=80%. #2)  6:00, retroareolar-invasive ductal carcinoma, poorly differentiated, ER-/AL-/Her2-(1+); Ki-67=80%. #3)  10:00, 4 cm FN-invasive ductal carcinoma, poorly differentiated.  DCIS, high nuclear grade, solid pattern, ER-/AL-/Her2-(1+); Ki-67=80%.  7/26/2024-Breast MRI-multifocal disease within the right breast without evidence of adenopathy (prior LND).  A dominant 4 cm enhancing mass with malignant kinetic curves at the retroareolar region of the right breast at 12:00 and an additional area of enhancement at right breast 10:00 measuring 2.5 cm.  8/12/2024-PET/CT scan-multifocal hypermetabolic neoplasm noted in the right breast.  Multiple hypermetabolic lymph node metastases noted in the right internal mammary chain. 9/10/2024-Beginning Keynote 522.  [de-identified] : Multifocal Infiltrating duct carcinoma [de-identified] :  For 6:00 and 10:00 lesions: ER- (<1%)/OH-(<1%)/Her2-(1+); Ki-67=80%. [de-identified] : BRCA 1 sequencing (5 site rearrangement panel) no mutation detected.\par  BRCA 2 sequencing-no mutation detected. [de-identified] : Generally doing well. Feels right breast mass a little smaller. No current fevers, cough or SOB. No N/V, f/s/c.  Taking care of father (had a CVA)-he lives in a nearby home.  Patient has 1 daughter whom she says is battling depression and is not of much support.  She does have a sister whom she has notified of her current diagnosis.  She also has a brother who may be of assistance if needed pending course.

## 2024-09-24 NOTE — ASSESSMENT
[FreeTextEntry1] : Lab work reviewed. #1) History of right breast cancer 6/2006-Clinically Stage IIB (T2N1M0), ER negative/CT negative/HER-2 sandro negative. Status post neoadjuvant TAC--> right breast conservation surgery-->RT.  #2) ADH left breast-patent was agreeable to breast cancer surveillance (declined chemopreventative therapy).  #3) Now, multifocal right breast cancer: 7/2/20241422-Uimrijgyv-lijxigyzca right breast mammographic mass corresponding to a sonographic mass at 12:00 retroareolar region.  3 additional suspicious right breast sonographic masses. 7/11/2024-US guided Right breast biopsies- #1-12:00, retroareolar-invasive ductal carcinoma, poorly differentiated, ER low+(1-10%)/CT-/Her2-(1+); Ki-67=80%. #2)  6:00, retroareolar-invasive ductal carcinoma, poorly differentiated, ER-/CT-/Her2-(1+); Ki-67=80%. #3)  10:00, 4 cm FN-invasive ductal carcinoma, poorly differentiated.  DCIS, high nuclear grade, solid pattern, ER-/CT-/Her2-(1+); Ki-67=80%.  7/26/2024-Breast MRI-multifocal disease within the right breast without evidence of adenopathy (prior LND).  A dominant 4 cm enhancing mass with malignant kinetic curves at the retroareolar region of the right breast at 12:00 and an additional area of enhancement at right breast 10:00 measuring 2.5 cm.  8/12/2024-PET/CT scan-multifocal hypermetabolic neoplasm noted in the right breast.  Multiple hypermetabolic lymph node metastases noted in the right internal mammary chain.  For TNBC, recommend neoadjuvant Keynote 522 protocol. As patient has had adriamycin in the past, need to have cardiology continue to follow-appreciated Dr. Olivarez's input. --Post neoadjuvant systemic therapy, to re-eval. for surgery (patient considering b/l mastectomies at this point), and then ?RT. --Pending pathologic response to neoadjuvant therapy, further adjuvant therapy decisions to be finalized   --continue f/u with [facility] cardiology, Dr. Olivarez --clinically stable for treatment today; neupogen support as needed-patient to have injected at home by a friend who is an NP   #4) history of relative lymphocytosis/T-cell clonal population of undetermined significance.  10/2014 Peripheral blood flow cytometry showed no morphologic or immunophenotypic evidence of a lymphoproliferative disorder or presence of blasts.  Smear review showed increase in eosinophils, and borderline increase in lymphocytes, which showed diminished expression of CD 7. These findings could be seen in a T cell lymphoproliferative condition. This may also be seen in inflammatory conditions where the loss is temporary in nature. 3/2016-Peripheral blood T cell gene rearrangement study showed a clonal T-cell receptor gamma population detected. 3/2016- Abdominal ultrasound-unremarkable size of the liver and spleen. 6/2016-T-cell gene clonality study was positive for a clonal T-cell receptor beta population. --have reviewed that some bone marrow disorders/LPD's may evolve over time. In light of chronicity of her relative lymphocytosis and lack of related symptoms, patient has wished for continued lab work surveillance and no BMB.  Will need growth factor support during chemotherapy as above.   --Should her hematologic/clinical scenario change/worsen, may need to reconsider BMB.    #5) 8/26/20243891-ctikpdcgkyrpyt-XZOL-67%.  Patient was given the opportunity to ask questions. Her questions have been answered to her apparent satisfaction at this time.  Case discussed with surgeon Dr. Nicholson 8/20/2024  She reported internal mammary nodes retrosternal with no plans to biopsy.  Will have interval follow-up imaging prior to eventual surgery.   **Case presented at multidisciplinary breast TB conference-consensus in agreement with neoadjuvant therapy-plan Keynote 522 protocol for TNBC. Dr. Olivarez (cardiology) to assist with cardiac recommendations/management with anticipated adriamycin use. May consider adjuvant radiation post eventual surgery per radiation oncology.   -->Plan: Neoadjuvant: Pembrolizumab 200 mg IV day 1, Paclitaxel 80 mg/m IV days 1, 8, 15, Carboplatin with AUC of 1.5 IV days 1, 8, 15.  Cycle is every 21 days x 4 cycles.  This would be followed with Pembrolizumab 200 mg IV day 1, Doxorubicin 60 mg/m IV day 1, Cyclophosphamide 600 mg/m IV day 1 with cycle every 21 days x 4 cycles. Cycle #1 Day# 8 Taxol, Carboplatin today. Neupogen 480 mcg SQ daily x 3 days post chemo.(to receive at home).

## 2024-09-24 NOTE — PHYSICAL EXAM
[Fully active, able to carry on all pre-disease performance without restriction] : Status 0 - Fully active, able to carry on all pre-disease performance without restriction [Normal] : affect appropriate [de-identified] : s/p right CW port placement [de-identified] : ~4 cm mass at ~12:00 right breast above areola feels softer, less discrete; ~2 cm mass at ~6:00 inf. to areola; no dominant left breast mass palpable

## 2024-09-24 NOTE — ASSESSMENT
[FreeTextEntry1] : Lab work reviewed. #1) History of right breast cancer 6/2006-Clinically Stage IIB (T2N1M0), ER negative/ND negative/HER-2 sandro negative. Status post neoadjuvant TAC--> right breast conservation surgery-->RT.  #2) ADH left breast-patent was agreeable to breast cancer surveillance (declined chemopreventative therapy).  #3) Now, multifocal right breast cancer: 7/2/20248316-Oyikbrpom-janilkdnpr right breast mammographic mass corresponding to a sonographic mass at 12:00 retroareolar region.  3 additional suspicious right breast sonographic masses. 7/11/2024-US guided Right breast biopsies- #1-12:00, retroareolar-invasive ductal carcinoma, poorly differentiated, ER low+(1-10%)/ND-/Her2-(1+); Ki-67=80%. #2)  6:00, retroareolar-invasive ductal carcinoma, poorly differentiated, ER-/ND-/Her2-(1+); Ki-67=80%. #3)  10:00, 4 cm FN-invasive ductal carcinoma, poorly differentiated.  DCIS, high nuclear grade, solid pattern, ER-/ND-/Her2-(1+); Ki-67=80%.  7/26/2024-Breast MRI-multifocal disease within the right breast without evidence of adenopathy (prior LND).  A dominant 4 cm enhancing mass with malignant kinetic curves at the retroareolar region of the right breast at 12:00 and an additional area of enhancement at right breast 10:00 measuring 2.5 cm.  8/12/2024-PET/CT scan-multifocal hypermetabolic neoplasm noted in the right breast.  Multiple hypermetabolic lymph node metastases noted in the right internal mammary chain.  For TNBC, recommend neoadjuvant Keynote 522 protocol. As patient has had adriamycin in the past, need to have cardiology continue to follow-appreciated Dr. Olivarez's input. --Post neoadjuvant systemic therapy, to re-eval. for surgery (patient considering b/l mastectomies at this point), and then ?RT. --Pending pathologic response to neoadjuvant therapy, further adjuvant therapy decisions to be finalized   --continue f/u with [facility] cardiology, Dr. Olivarez --clinically stable for treatment today; neupogen support as needed-patient to have injected at home by a friend who is an NP   #4) history of relative lymphocytosis/T-cell clonal population of undetermined significance.  10/2014 Peripheral blood flow cytometry showed no morphologic or immunophenotypic evidence of a lymphoproliferative disorder or presence of blasts.  Smear review showed increase in eosinophils, and borderline increase in lymphocytes, which showed diminished expression of CD 7. These findings could be seen in a T cell lymphoproliferative condition. This may also be seen in inflammatory conditions where the loss is temporary in nature. 3/2016-Peripheral blood T cell gene rearrangement study showed a clonal T-cell receptor gamma population detected. 3/2016- Abdominal ultrasound-unremarkable size of the liver and spleen. 6/2016-T-cell gene clonality study was positive for a clonal T-cell receptor beta population. --have reviewed that some bone marrow disorders/LPD's may evolve over time. In light of chronicity of her relative lymphocytosis and lack of related symptoms, patient has wished for continued lab work surveillance and no BMB.  Will need growth factor support during chemotherapy as above.   --Should her hematologic/clinical scenario change/worsen, may need to reconsider BMB.    #5) 8/26/20242355-mumfjytikqogbs-UFPL-67%.  Patient was given the opportunity to ask questions. Her questions have been answered to her apparent satisfaction at this time.  Case discussed with surgeon Dr. Nicholson 8/20/2024  She reported internal mammary nodes retrosternal with no plans to biopsy.  Will have interval follow-up imaging prior to eventual surgery.   **Case presented at multidisciplinary breast TB conference-consensus in agreement with neoadjuvant therapy-plan Keynote 522 protocol for TNBC. Dr. Olivarez (cardiology) to assist with cardiac recommendations/management with anticipated adriamycin use. May consider adjuvant radiation post eventual surgery per radiation oncology.   -->Plan: Neoadjuvant: Pembrolizumab 200 mg IV day 1, Paclitaxel 80 mg/m IV days 1, 8, 15, Carboplatin with AUC of 1.5 IV days 1, 8, 15.  Cycle is every 21 days x 4 cycles.  This would be followed with Pembrolizumab 200 mg IV day 1, Doxorubicin 60 mg/m IV day 1, Cyclophosphamide 600 mg/m IV day 1 with cycle every 21 days x 4 cycles. Cycle #1 Day# 8 Taxol, Carboplatin today. Neupogen 480 mcg SQ daily x 3 days post chemo.(to receive at home).

## 2024-09-24 NOTE — HISTORY OF PRESENT ILLNESS
[Disease: _____________________] : Disease: [unfilled] [T: ___] : T[unfilled] [N: ___] : N[unfilled] [M: ___] : M[unfilled] [AJCC Stage: ____] : AJCC Stage: [unfilled] [de-identified] : History of right breast cancer 6/2006-Clinically Stage IIB (T2N1M0), ER negative/NV negative/HER-2 sandro negative. Status post neoadjuvant TAC--> right breast conservation surgery-->RT.  History of relative increase in lymphocytes , at least dating back to 5/2008. 10/2014 Peripheral blood flow cytometry showed no morphologic or immunophenotypic evidence of a lymphoproliferative disorder or presence of blasts.  Smear review showed increase in eosinophils, and borderline increase in lymphocytes, which showed diminished expression of CD 7. These findings could be seen in a T cell lymphoproliferative condition. This may also be seen in inflammatory conditions where the loss is temporary in nature. 3/2016-Peripheral blood T cell gene rearrangement study showed a clonal T-cell receptor gamma population detected. 3/2016- Abdominal ultrasound-unremarkable size of the liver and spleen. 6/2016-T-cell gene clonality study was positive for a clonal T-cell receptor beta population.  2/2021-Left breast ADH s/p lumpectomy. Patient declined chemopreventative therapy.  7/2/20248097-Xyfjynuwk-wrtzvjscol right breast mammographic mass corresponding to a sonographic mass at 12:00 retroareolar region.  3 additional suspicious right breast sonographic masses.  Ultrasound-guided core biopsy is recommended. 7/11/2024-US guided Right breast biopsies- #1-12:00, retroareolar-invasive ductal carcinoma, poorly differentiated, ER low+(1-10%)/NV-/Her2-(1+); Ki-67=80%. #2)  6:00, retroareolar-invasive ductal carcinoma, poorly differentiated, ER-/NV-/Her2-(1+); Ki-67=80%. #3)  10:00, 4 cm FN-invasive ductal carcinoma, poorly differentiated.  DCIS, high nuclear grade, solid pattern, ER-/NV-/Her2-(1+); Ki-67=80%.  7/26/2024-Breast MRI-multifocal disease within the right breast without evidence of adenopathy (prior LND).  A dominant 4 cm enhancing mass with malignant kinetic curves at the retroareolar region of the right breast at 12:00 and an additional area of enhancement at right breast 10:00 measuring 2.5 cm.  8/12/2024-PET/CT scan-multifocal hypermetabolic neoplasm noted in the right breast.  Multiple hypermetabolic lymph node metastases noted in the right internal mammary chain. 9/10/2024-Beginning Keynote 522.  [de-identified] : Multifocal Infiltrating duct carcinoma [de-identified] :  For 6:00 and 10:00 lesions: ER- (<1%)/NC-(<1%)/Her2-(1+); Ki-67=80%. [de-identified] : BRCA 1 sequencing (5 site rearrangement panel) no mutation detected.\par  BRCA 2 sequencing-no mutation detected. [de-identified] : Generally doing well. Feels right breast mass a little smaller. No current fevers, cough or SOB. No N/V, f/s/c.  Taking care of father (had a CVA)-he lives in a nearby home.  Patient has 1 daughter whom she says is battling depression and is not of much support.  She does have a sister whom she has notified of her current diagnosis.  She also has a brother who may be of assistance if needed pending course.

## 2024-09-24 NOTE — PHYSICAL EXAM
[Fully active, able to carry on all pre-disease performance without restriction] : Status 0 - Fully active, able to carry on all pre-disease performance without restriction [Normal] : affect appropriate [de-identified] : s/p right CW port placement [de-identified] : ~4 cm mass at ~12:00 right breast above areola feels softer, less discrete; ~2 cm mass at ~6:00 inf. to areola; no dominant left breast mass palpable

## 2024-09-24 NOTE — HISTORY OF PRESENT ILLNESS
[Disease: _____________________] : Disease: [unfilled] [T: ___] : T[unfilled] [N: ___] : N[unfilled] [M: ___] : M[unfilled] [AJCC Stage: ____] : AJCC Stage: [unfilled] [de-identified] : History of right breast cancer 6/2006-Clinically Stage IIB (T2N1M0), ER negative/[place] negative/HER-2 sandro negative. Status post neoadjuvant TAC--> right breast conservation surgery-->RT.  History of relative increase in lymphocytes , at least dating back to 5/2008. 10/2014 Peripheral blood flow cytometry showed no morphologic or immunophenotypic evidence of a lymphoproliferative disorder or presence of blasts.  Smear review showed increase in eosinophils, and borderline increase in lymphocytes, which showed diminished expression of CD 7. These findings could be seen in a T cell lymphoproliferative condition. This may also be seen in inflammatory conditions where the loss is temporary in nature. 3/2016-Peripheral blood T cell gene rearrangement study showed a clonal T-cell receptor gamma population detected. 3/2016- Abdominal ultrasound-unremarkable size of the liver and spleen. 6/2016-T-cell gene clonality study was positive for a clonal T-cell receptor beta population.  2/2021-Left breast ADH s/p lumpectomy. Patient declined chemopreventative therapy.  7/2/20243950-Ibtrhcjzw-uptmpfocho right breast mammographic mass corresponding to a sonographic mass at 12:00 retroareolar region.  3 additional suspicious right breast sonographic masses.  Ultrasound-guided core biopsy is recommended. 7/11/2024-US guided Right breast biopsies- #1-12:00, retroareolar-invasive ductal carcinoma, poorly differentiated, ER low+(1-10%)/[place]-/Her2-(1+); Ki-67=80%. #2)  6:00, retroareolar-invasive ductal carcinoma, poorly differentiated, ER-/[place]-/Her2-(1+); Ki-67=80%. #3)  10:00, 4 cm FN-invasive ductal carcinoma, poorly differentiated.  DCIS, high nuclear grade, solid pattern, ER-/[place]-/Her2-(1+); Ki-67=80%.  7/26/2024-Breast MRI-multifocal disease within the right breast without evidence of adenopathy (prior LND).  A dominant 4 cm enhancing mass with malignant kinetic curves at the retroareolar region of the right breast at 12:00 and an additional area of enhancement at right breast 10:00 measuring 2.5 cm.  8/12/2024-PET/CT scan-multifocal hypermetabolic neoplasm noted in the right breast.  Multiple hypermetabolic lymph node metastases noted in the right internal mammary chain. 9/10/2024-Beginning Keynote 522.  [de-identified] : Multifocal Infiltrating duct carcinoma [de-identified] :  For 6:00 and 10:00 lesions: ER- (<1%)/MS-(<1%)/Her2-(1+); Ki-67=80%. [de-identified] : BRCA 1 sequencing (5 site rearrangement panel) no mutation detected.\par  BRCA 2 sequencing-no mutation detected. [de-identified] : Generally doing well. Feels right breast mass a little smaller. No current fevers, cough or SOB. No N/V, f/s/c.  Taking care of father (had a CVA)-he lives in a nearby home.  Patient has 1 daughter whom she says is battling depression and is not of much support.  She does have a sister whom she has notified of her current diagnosis.  She also has a brother who may be of assistance if needed pending course.

## 2024-09-24 NOTE — ASSESSMENT
[FreeTextEntry1] : Lab work reviewed. #1) History of right breast cancer 6/2006-Clinically Stage IIB (T2N1M0), ER negative/[place] negative/HER-2 sandro negative. Status post neoadjuvant TAC--> right breast conservation surgery-->RT.  #2) ADH left breast-patent was agreeable to breast cancer surveillance (declined chemopreventative therapy).  #3) Now, multifocal right breast cancer: 7/2/20242714-Xwrcdqoeh-cpbafwvnpv right breast mammographic mass corresponding to a sonographic mass at 12:00 retroareolar region.  3 additional suspicious right breast sonographic masses. 7/11/2024-US guided Right breast biopsies- #1-12:00, retroareolar-invasive ductal carcinoma, poorly differentiated, ER low+(1-10%)/[place]-/Her2-(1+); Ki-67=80%. #2)  6:00, retroareolar-invasive ductal carcinoma, poorly differentiated, ER-/[place]-/Her2-(1+); Ki-67=80%. #3)  10:00, 4 cm FN-invasive ductal carcinoma, poorly differentiated.  DCIS, high nuclear grade, solid pattern, ER-/[place]-/Her2-(1+); Ki-67=80%.  7/26/2024-Breast MRI-multifocal disease within the right breast without evidence of adenopathy (prior LND).  A dominant 4 cm enhancing mass with malignant kinetic curves at the retroareolar region of the right breast at 12:00 and an additional area of enhancement at right breast 10:00 measuring 2.5 cm.  8/12/2024-PET/CT scan-multifocal hypermetabolic neoplasm noted in the right breast.  Multiple hypermetabolic lymph node metastases noted in the right internal mammary chain.  For TNBC, recommend neoadjuvant Keynote 522 protocol. As patient has had adriamycin in the past, need to have cardiology continue to follow-appreciated Dr. Olivarez's input. --Post neoadjuvant systemic therapy, to re-eval. for surgery (patient considering b/l mastectomies at this point), and then ?RT. --Pending pathologic response to neoadjuvant therapy, further adjuvant therapy decisions to be finalized   --continue f/u with [facility] cardiology, Dr. Olivarez --clinically stable for treatment today; neupogen support as needed-patient to have injected at home by a friend who is an NP   #4) history of relative lymphocytosis/T-cell clonal population of undetermined significance.  10/2014 Peripheral blood flow cytometry showed no morphologic or immunophenotypic evidence of a lymphoproliferative disorder or presence of blasts.  Smear review showed increase in eosinophils, and borderline increase in lymphocytes, which showed diminished expression of CD 7. These findings could be seen in a T cell lymphoproliferative condition. This may also be seen in inflammatory conditions where the loss is temporary in nature. 3/2016-Peripheral blood T cell gene rearrangement study showed a clonal T-cell receptor gamma population detected. 3/2016- Abdominal ultrasound-unremarkable size of the liver and spleen. 6/2016-T-cell gene clonality study was positive for a clonal T-cell receptor beta population. --have reviewed that some bone marrow disorders/LPD's may evolve over time. In light of chronicity of her relative lymphocytosis and lack of related symptoms, patient has wished for continued lab work surveillance and no BMB.  Will need growth factor support during chemotherapy as above.   --Should her hematologic/clinical scenario change/worsen, may need to reconsider BMB.    #5) 8/26/20240987-ogsrukuixiqkcc-SNSA-67%.  Patient was given the opportunity to ask questions. Her questions have been answered to her apparent satisfaction at this time.  Case discussed with surgeon Dr. Nicholson 8/20/2024  She reported internal mammary nodes retrosternal with no plans to biopsy.  Will have interval follow-up imaging prior to eventual surgery.   **Case presented at multidisciplinary breast TB conference-consensus in agreement with neoadjuvant therapy-plan Keynote 522 protocol for TNBC. Dr. Olivarez (cardiology) to assist with cardiac recommendations/management with anticipated adriamycin use. May consider adjuvant radiation post eventual surgery per radiation oncology.   -->Plan: Neoadjuvant: Pembrolizumab 200 mg IV day 1, Paclitaxel 80 mg/m IV days 1, 8, 15, Carboplatin with AUC of 1.5 IV days 1, 8, 15.  Cycle is every 21 days x 4 cycles.  This would be followed with Pembrolizumab 200 mg IV day 1, Doxorubicin 60 mg/m IV day 1, Cyclophosphamide 600 mg/m IV day 1 with cycle every 21 days x 4 cycles. Cycle #1 Day# 8 Taxol, Carboplatin today. Neupogen 480 mcg SQ daily x 3 days post chemo.(to receive at home).

## 2024-09-25 ENCOUNTER — APPOINTMENT (OUTPATIENT)
Dept: INFUSION THERAPY | Facility: HOSPITAL | Age: 59
End: 2024-09-25

## 2024-09-25 RX ORDER — FILGRASTIM 480 UG/.8ML
480 INJECTION, SOLUTION INTRAVENOUS; SUBCUTANEOUS
Qty: 3 | Refills: 3 | Status: ACTIVE | COMMUNITY
Start: 2024-09-24 | End: 1900-01-01

## 2024-09-26 ENCOUNTER — APPOINTMENT (OUTPATIENT)
Dept: INFUSION THERAPY | Facility: HOSPITAL | Age: 59
End: 2024-09-26

## 2024-09-27 ENCOUNTER — APPOINTMENT (OUTPATIENT)
Dept: INFUSION THERAPY | Facility: HOSPITAL | Age: 59
End: 2024-09-27

## 2024-10-01 ENCOUNTER — RESULT REVIEW (OUTPATIENT)
Age: 59
End: 2024-10-01

## 2024-10-01 ENCOUNTER — APPOINTMENT (OUTPATIENT)
Dept: HEMATOLOGY ONCOLOGY | Facility: CLINIC | Age: 59
End: 2024-10-01

## 2024-10-01 ENCOUNTER — APPOINTMENT (OUTPATIENT)
Dept: INFUSION THERAPY | Facility: HOSPITAL | Age: 59
End: 2024-10-01

## 2024-10-01 LAB
ALBUMIN SERPL ELPH-MCNC: 3.9 G/DL — SIGNIFICANT CHANGE UP (ref 3.3–5)
ALP SERPL-CCNC: 124 U/L — HIGH (ref 40–120)
ALT FLD-CCNC: 44 U/L — SIGNIFICANT CHANGE UP (ref 10–45)
ANION GAP SERPL CALC-SCNC: 10 MMOL/L — SIGNIFICANT CHANGE UP (ref 5–17)
AST SERPL-CCNC: 37 U/L — SIGNIFICANT CHANGE UP (ref 10–40)
BASOPHILS # BLD AUTO: 0.09 K/UL — SIGNIFICANT CHANGE UP (ref 0–0.2)
BASOPHILS NFR BLD AUTO: 1.3 % — SIGNIFICANT CHANGE UP (ref 0–2)
BILIRUB SERPL-MCNC: 0.6 MG/DL — SIGNIFICANT CHANGE UP (ref 0.2–1.2)
BUN SERPL-MCNC: 9 MG/DL — SIGNIFICANT CHANGE UP (ref 7–23)
CALCIUM SERPL-MCNC: 9.7 MG/DL — SIGNIFICANT CHANGE UP (ref 8.4–10.5)
CHLORIDE SERPL-SCNC: 105 MMOL/L — SIGNIFICANT CHANGE UP (ref 96–108)
CO2 SERPL-SCNC: 27 MMOL/L — SIGNIFICANT CHANGE UP (ref 22–31)
CREAT SERPL-MCNC: 0.6 MG/DL — SIGNIFICANT CHANGE UP (ref 0.5–1.3)
EGFR: 102 ML/MIN/1.73M2 — SIGNIFICANT CHANGE UP
EOSINOPHIL # BLD AUTO: 0.09 K/UL — SIGNIFICANT CHANGE UP (ref 0–0.5)
EOSINOPHIL NFR BLD AUTO: 1.3 % — SIGNIFICANT CHANGE UP (ref 0–6)
GLUCOSE SERPL-MCNC: 90 MG/DL — SIGNIFICANT CHANGE UP (ref 70–99)
HCT VFR BLD CALC: 37.5 % — SIGNIFICANT CHANGE UP (ref 34.5–45)
HGB BLD-MCNC: 11.9 G/DL — SIGNIFICANT CHANGE UP (ref 11.5–15.5)
IMM GRANULOCYTES NFR BLD AUTO: 2.8 % — HIGH (ref 0–0.9)
LYMPHOCYTES # BLD AUTO: 3.99 K/UL — HIGH (ref 1–3.3)
LYMPHOCYTES # BLD AUTO: 56.5 % — HIGH (ref 13–44)
MCHC RBC-ENTMCNC: 26.3 PG — LOW (ref 27–34)
MCHC RBC-ENTMCNC: 31.7 G/DL — LOW (ref 32–36)
MCV RBC AUTO: 83 FL — SIGNIFICANT CHANGE UP (ref 80–100)
MONOCYTES # BLD AUTO: 0.73 K/UL — SIGNIFICANT CHANGE UP (ref 0–0.9)
MONOCYTES NFR BLD AUTO: 10.3 % — SIGNIFICANT CHANGE UP (ref 2–14)
NEUTROPHILS # BLD AUTO: 1.96 K/UL — SIGNIFICANT CHANGE UP (ref 1.8–7.4)
NEUTROPHILS NFR BLD AUTO: 27.8 % — LOW (ref 43–77)
NRBC # BLD: 0 /100 WBCS — SIGNIFICANT CHANGE UP (ref 0–0)
PLATELET # BLD AUTO: 186 K/UL — SIGNIFICANT CHANGE UP (ref 150–400)
POTASSIUM SERPL-MCNC: 3.8 MMOL/L — SIGNIFICANT CHANGE UP (ref 3.5–5.3)
POTASSIUM SERPL-SCNC: 3.8 MMOL/L — SIGNIFICANT CHANGE UP (ref 3.5–5.3)
PROT SERPL-MCNC: 6.7 G/DL — SIGNIFICANT CHANGE UP (ref 6–8.3)
RBC # BLD: 4.52 M/UL — SIGNIFICANT CHANGE UP (ref 3.8–5.2)
RBC # FLD: 15.4 % — HIGH (ref 10.3–14.5)
SODIUM SERPL-SCNC: 142 MMOL/L — SIGNIFICANT CHANGE UP (ref 135–145)
TSH SERPL-MCNC: 1.57 UIU/ML — SIGNIFICANT CHANGE UP (ref 0.27–4.2)
WBC # BLD: 7.06 K/UL — SIGNIFICANT CHANGE UP (ref 3.8–10.5)
WBC # FLD AUTO: 7.06 K/UL — SIGNIFICANT CHANGE UP (ref 3.8–10.5)

## 2024-10-02 ENCOUNTER — APPOINTMENT (OUTPATIENT)
Dept: INFUSION THERAPY | Facility: HOSPITAL | Age: 59
End: 2024-10-02

## 2024-10-03 ENCOUNTER — APPOINTMENT (OUTPATIENT)
Dept: INFUSION THERAPY | Facility: HOSPITAL | Age: 59
End: 2024-10-03

## 2024-10-04 ENCOUNTER — APPOINTMENT (OUTPATIENT)
Dept: INFUSION THERAPY | Facility: HOSPITAL | Age: 59
End: 2024-10-04

## 2024-10-08 ENCOUNTER — APPOINTMENT (OUTPATIENT)
Dept: HEMATOLOGY ONCOLOGY | Facility: CLINIC | Age: 59
End: 2024-10-08
Payer: MEDICAID

## 2024-10-08 ENCOUNTER — RESULT REVIEW (OUTPATIENT)
Age: 59
End: 2024-10-08

## 2024-10-08 ENCOUNTER — APPOINTMENT (OUTPATIENT)
Dept: INFUSION THERAPY | Facility: HOSPITAL | Age: 59
End: 2024-10-08

## 2024-10-08 ENCOUNTER — NON-APPOINTMENT (OUTPATIENT)
Age: 59
End: 2024-10-08

## 2024-10-08 VITALS
HEART RATE: 68 BPM | TEMPERATURE: 98.2 F | SYSTOLIC BLOOD PRESSURE: 112 MMHG | RESPIRATION RATE: 16 BRPM | DIASTOLIC BLOOD PRESSURE: 67 MMHG | OXYGEN SATURATION: 98 %

## 2024-10-08 LAB
ALBUMIN SERPL ELPH-MCNC: 3.9 G/DL — SIGNIFICANT CHANGE UP (ref 3.3–5)
ALP SERPL-CCNC: 138 U/L — HIGH (ref 40–120)
ALT FLD-CCNC: 75 U/L — HIGH (ref 10–45)
ANION GAP SERPL CALC-SCNC: 11 MMOL/L — SIGNIFICANT CHANGE UP (ref 5–17)
AST SERPL-CCNC: 43 U/L — HIGH (ref 10–40)
BASOPHILS # BLD AUTO: 0.1 K/UL — SIGNIFICANT CHANGE UP (ref 0–0.2)
BASOPHILS NFR BLD AUTO: 1 % — SIGNIFICANT CHANGE UP (ref 0–2)
BILIRUB SERPL-MCNC: 0.5 MG/DL — SIGNIFICANT CHANGE UP (ref 0.2–1.2)
BUN SERPL-MCNC: 11 MG/DL — SIGNIFICANT CHANGE UP (ref 7–23)
CALCIUM SERPL-MCNC: 9.9 MG/DL — SIGNIFICANT CHANGE UP (ref 8.4–10.5)
CHLORIDE SERPL-SCNC: 105 MMOL/L — SIGNIFICANT CHANGE UP (ref 96–108)
CO2 SERPL-SCNC: 26 MMOL/L — SIGNIFICANT CHANGE UP (ref 22–31)
CREAT SERPL-MCNC: 0.62 MG/DL — SIGNIFICANT CHANGE UP (ref 0.5–1.3)
EGFR: 101 ML/MIN/1.73M2 — SIGNIFICANT CHANGE UP
EOSINOPHIL # BLD AUTO: 0.06 K/UL — SIGNIFICANT CHANGE UP (ref 0–0.5)
EOSINOPHIL NFR BLD AUTO: 0.6 % — SIGNIFICANT CHANGE UP (ref 0–6)
GLUCOSE SERPL-MCNC: 129 MG/DL — HIGH (ref 70–99)
HCT VFR BLD CALC: 36.1 % — SIGNIFICANT CHANGE UP (ref 34.5–45)
HGB BLD-MCNC: 11.5 G/DL — SIGNIFICANT CHANGE UP (ref 11.5–15.5)
IMM GRANULOCYTES NFR BLD AUTO: 4 % — HIGH (ref 0–0.9)
LYMPHOCYTES # BLD AUTO: 4.59 K/UL — HIGH (ref 1–3.3)
LYMPHOCYTES # BLD AUTO: 46.6 % — HIGH (ref 13–44)
MAGNESIUM SERPL-MCNC: 2 MG/DL — SIGNIFICANT CHANGE UP (ref 1.6–2.6)
MCHC RBC-ENTMCNC: 26.3 PG — LOW (ref 27–34)
MCHC RBC-ENTMCNC: 31.9 G/DL — LOW (ref 32–36)
MCV RBC AUTO: 82.6 FL — SIGNIFICANT CHANGE UP (ref 80–100)
MONOCYTES # BLD AUTO: 1.08 K/UL — HIGH (ref 0–0.9)
MONOCYTES NFR BLD AUTO: 11 % — SIGNIFICANT CHANGE UP (ref 2–14)
NEUTROPHILS # BLD AUTO: 3.62 K/UL — SIGNIFICANT CHANGE UP (ref 1.8–7.4)
NEUTROPHILS NFR BLD AUTO: 36.8 % — LOW (ref 43–77)
NRBC # BLD: 0 /100 WBCS — SIGNIFICANT CHANGE UP (ref 0–0)
PLATELET # BLD AUTO: 204 K/UL — SIGNIFICANT CHANGE UP (ref 150–400)
POTASSIUM SERPL-MCNC: 3.6 MMOL/L — SIGNIFICANT CHANGE UP (ref 3.5–5.3)
POTASSIUM SERPL-SCNC: 3.6 MMOL/L — SIGNIFICANT CHANGE UP (ref 3.5–5.3)
PROT SERPL-MCNC: 6.8 G/DL — SIGNIFICANT CHANGE UP (ref 6–8.3)
RBC # BLD: 4.37 M/UL — SIGNIFICANT CHANGE UP (ref 3.8–5.2)
RBC # FLD: 15.9 % — HIGH (ref 10.3–14.5)
SODIUM SERPL-SCNC: 142 MMOL/L — SIGNIFICANT CHANGE UP (ref 135–145)
WBC # BLD: 9.84 K/UL — SIGNIFICANT CHANGE UP (ref 3.8–10.5)
WBC # FLD AUTO: 9.84 K/UL — SIGNIFICANT CHANGE UP (ref 3.8–10.5)

## 2024-10-08 PROCEDURE — 99214 OFFICE O/P EST MOD 30 MIN: CPT

## 2024-10-08 PROCEDURE — G2211 COMPLEX E/M VISIT ADD ON: CPT | Mod: NC

## 2024-10-09 ENCOUNTER — APPOINTMENT (OUTPATIENT)
Dept: INFUSION THERAPY | Facility: HOSPITAL | Age: 59
End: 2024-10-09

## 2024-10-09 ENCOUNTER — NON-APPOINTMENT (OUTPATIENT)
Age: 59
End: 2024-10-09

## 2024-10-10 ENCOUNTER — OUTPATIENT (OUTPATIENT)
Dept: OUTPATIENT SERVICES | Facility: HOSPITAL | Age: 59
LOS: 1 days | Discharge: HOME | End: 2024-10-10
Payer: MEDICAID

## 2024-10-10 ENCOUNTER — APPOINTMENT (OUTPATIENT)
Dept: INFUSION THERAPY | Facility: HOSPITAL | Age: 59
End: 2024-10-10

## 2024-10-10 DIAGNOSIS — Z90.710 ACQUIRED ABSENCE OF BOTH CERVIX AND UTERUS: Chronic | ICD-10-CM

## 2024-10-10 DIAGNOSIS — Z98.890 OTHER SPECIFIED POSTPROCEDURAL STATES: Chronic | ICD-10-CM

## 2024-10-10 DIAGNOSIS — D64.9 ANEMIA, UNSPECIFIED: ICD-10-CM

## 2024-10-11 ENCOUNTER — NON-APPOINTMENT (OUTPATIENT)
Age: 59
End: 2024-10-11

## 2024-10-11 ENCOUNTER — APPOINTMENT (OUTPATIENT)
Dept: INFUSION THERAPY | Facility: HOSPITAL | Age: 59
End: 2024-10-11

## 2024-10-15 ENCOUNTER — RESULT REVIEW (OUTPATIENT)
Age: 59
End: 2024-10-15

## 2024-10-15 ENCOUNTER — APPOINTMENT (OUTPATIENT)
Dept: INFUSION THERAPY | Facility: HOSPITAL | Age: 59
End: 2024-10-15

## 2024-10-15 ENCOUNTER — NON-APPOINTMENT (OUTPATIENT)
Age: 59
End: 2024-10-15

## 2024-10-15 ENCOUNTER — APPOINTMENT (OUTPATIENT)
Dept: HEMATOLOGY ONCOLOGY | Facility: CLINIC | Age: 59
End: 2024-10-15
Payer: MEDICAID

## 2024-10-15 VITALS
DIASTOLIC BLOOD PRESSURE: 71 MMHG | TEMPERATURE: 98.4 F | SYSTOLIC BLOOD PRESSURE: 110 MMHG | OXYGEN SATURATION: 100 % | RESPIRATION RATE: 16 BRPM | HEART RATE: 61 BPM

## 2024-10-15 DIAGNOSIS — E87.6 HYPOKALEMIA: ICD-10-CM

## 2024-10-15 LAB
ALBUMIN SERPL ELPH-MCNC: 3.8 G/DL — SIGNIFICANT CHANGE UP (ref 3.3–5)
ALP SERPL-CCNC: 141 U/L — HIGH (ref 40–120)
ALT FLD-CCNC: 51 U/L — HIGH (ref 10–45)
ANION GAP SERPL CALC-SCNC: 11 MMOL/L — SIGNIFICANT CHANGE UP (ref 5–17)
AST SERPL-CCNC: 37 U/L — SIGNIFICANT CHANGE UP (ref 10–40)
BASOPHILS # BLD AUTO: 0.09 K/UL — SIGNIFICANT CHANGE UP (ref 0–0.2)
BASOPHILS NFR BLD AUTO: 1.2 % — SIGNIFICANT CHANGE UP (ref 0–2)
BILIRUB SERPL-MCNC: 0.4 MG/DL — SIGNIFICANT CHANGE UP (ref 0.2–1.2)
BUN SERPL-MCNC: 9 MG/DL — SIGNIFICANT CHANGE UP (ref 7–23)
CALCIUM SERPL-MCNC: 9.4 MG/DL — SIGNIFICANT CHANGE UP (ref 8.4–10.5)
CHLORIDE SERPL-SCNC: 104 MMOL/L — SIGNIFICANT CHANGE UP (ref 96–108)
CO2 SERPL-SCNC: 26 MMOL/L — SIGNIFICANT CHANGE UP (ref 22–31)
CREAT SERPL-MCNC: 0.56 MG/DL — SIGNIFICANT CHANGE UP (ref 0.5–1.3)
EGFR: 104 ML/MIN/1.73M2 — SIGNIFICANT CHANGE UP
EOSINOPHIL # BLD AUTO: 0.07 K/UL — SIGNIFICANT CHANGE UP (ref 0–0.5)
EOSINOPHIL NFR BLD AUTO: 1 % — SIGNIFICANT CHANGE UP (ref 0–6)
GLUCOSE SERPL-MCNC: 127 MG/DL — HIGH (ref 70–99)
HCT VFR BLD CALC: 34.7 % — SIGNIFICANT CHANGE UP (ref 34.5–45)
HGB BLD-MCNC: 11.1 G/DL — LOW (ref 11.5–15.5)
IMM GRANULOCYTES NFR BLD AUTO: 1.1 % — HIGH (ref 0–0.9)
LYMPHOCYTES # BLD AUTO: 3.96 K/UL — HIGH (ref 1–3.3)
LYMPHOCYTES # BLD AUTO: 54.4 % — HIGH (ref 13–44)
MAGNESIUM SERPL-MCNC: 2.1 MG/DL — SIGNIFICANT CHANGE UP (ref 1.6–2.6)
MCHC RBC-ENTMCNC: 26.4 PG — LOW (ref 27–34)
MCHC RBC-ENTMCNC: 32 G/DL — SIGNIFICANT CHANGE UP (ref 32–36)
MCV RBC AUTO: 82.6 FL — SIGNIFICANT CHANGE UP (ref 80–100)
MONOCYTES # BLD AUTO: 0.84 K/UL — SIGNIFICANT CHANGE UP (ref 0–0.9)
MONOCYTES NFR BLD AUTO: 11.5 % — SIGNIFICANT CHANGE UP (ref 2–14)
NEUTROPHILS # BLD AUTO: 2.24 K/UL — SIGNIFICANT CHANGE UP (ref 1.8–7.4)
NEUTROPHILS NFR BLD AUTO: 30.8 % — LOW (ref 43–77)
NRBC # BLD: 0 /100 WBCS — SIGNIFICANT CHANGE UP (ref 0–0)
PLATELET # BLD AUTO: 166 K/UL — SIGNIFICANT CHANGE UP (ref 150–400)
POTASSIUM SERPL-MCNC: 3.4 MMOL/L — LOW (ref 3.5–5.3)
POTASSIUM SERPL-SCNC: 3.4 MMOL/L — LOW (ref 3.5–5.3)
PROT SERPL-MCNC: 6.5 G/DL — SIGNIFICANT CHANGE UP (ref 6–8.3)
RBC # BLD: 4.2 M/UL — SIGNIFICANT CHANGE UP (ref 3.8–5.2)
RBC # FLD: 15.9 % — HIGH (ref 10.3–14.5)
SODIUM SERPL-SCNC: 141 MMOL/L — SIGNIFICANT CHANGE UP (ref 135–145)
WBC # BLD: 7.28 K/UL — SIGNIFICANT CHANGE UP (ref 3.8–10.5)
WBC # FLD AUTO: 7.28 K/UL — SIGNIFICANT CHANGE UP (ref 3.8–10.5)

## 2024-10-15 PROCEDURE — G2211 COMPLEX E/M VISIT ADD ON: CPT | Mod: NC

## 2024-10-15 PROCEDURE — 99214 OFFICE O/P EST MOD 30 MIN: CPT

## 2024-10-15 RX ORDER — POTASSIUM CHLORIDE 1500 MG/1
20 TABLET, FILM COATED, EXTENDED RELEASE ORAL
Qty: 5 | Refills: 0 | Status: ACTIVE | COMMUNITY
Start: 2024-10-15 | End: 1900-01-01

## 2024-10-16 ENCOUNTER — APPOINTMENT (OUTPATIENT)
Dept: INFUSION THERAPY | Facility: HOSPITAL | Age: 59
End: 2024-10-16

## 2024-10-16 DIAGNOSIS — C50.919 MALIGNANT NEOPLASM OF UNSPECIFIED SITE OF UNSPECIFIED FEMALE BREAST: ICD-10-CM

## 2024-10-16 DIAGNOSIS — R11.2 NAUSEA WITH VOMITING, UNSPECIFIED: ICD-10-CM

## 2024-10-16 DIAGNOSIS — Z51.11 ENCOUNTER FOR ANTINEOPLASTIC CHEMOTHERAPY: ICD-10-CM

## 2024-10-17 ENCOUNTER — APPOINTMENT (OUTPATIENT)
Dept: INFUSION THERAPY | Facility: HOSPITAL | Age: 59
End: 2024-10-17

## 2024-10-18 ENCOUNTER — APPOINTMENT (OUTPATIENT)
Dept: INFUSION THERAPY | Facility: HOSPITAL | Age: 59
End: 2024-10-18

## 2024-10-22 ENCOUNTER — RESULT REVIEW (OUTPATIENT)
Age: 59
End: 2024-10-22

## 2024-10-22 ENCOUNTER — APPOINTMENT (OUTPATIENT)
Dept: HEMATOLOGY ONCOLOGY | Facility: CLINIC | Age: 59
End: 2024-10-22
Payer: MEDICAID

## 2024-10-22 ENCOUNTER — APPOINTMENT (OUTPATIENT)
Dept: INFUSION THERAPY | Facility: HOSPITAL | Age: 59
End: 2024-10-22

## 2024-10-22 LAB
ALBUMIN SERPL ELPH-MCNC: 3.8 G/DL — SIGNIFICANT CHANGE UP (ref 3.3–5)
ALP SERPL-CCNC: 188 U/L — HIGH (ref 40–120)
ALT FLD-CCNC: 38 U/L — SIGNIFICANT CHANGE UP (ref 10–45)
ANION GAP SERPL CALC-SCNC: 7 MMOL/L — SIGNIFICANT CHANGE UP (ref 5–17)
AST SERPL-CCNC: 33 U/L — SIGNIFICANT CHANGE UP (ref 10–40)
BASOPHILS # BLD AUTO: 0.08 K/UL — SIGNIFICANT CHANGE UP (ref 0–0.2)
BASOPHILS NFR BLD AUTO: 0.6 % — SIGNIFICANT CHANGE UP (ref 0–2)
BILIRUB SERPL-MCNC: 0.4 MG/DL — SIGNIFICANT CHANGE UP (ref 0.2–1.2)
BUN SERPL-MCNC: 9 MG/DL — SIGNIFICANT CHANGE UP (ref 7–23)
CALCIUM SERPL-MCNC: 9.3 MG/DL — SIGNIFICANT CHANGE UP (ref 8.4–10.5)
CHLORIDE SERPL-SCNC: 106 MMOL/L — SIGNIFICANT CHANGE UP (ref 96–108)
CO2 SERPL-SCNC: 29 MMOL/L — SIGNIFICANT CHANGE UP (ref 22–31)
CREAT SERPL-MCNC: 0.59 MG/DL — SIGNIFICANT CHANGE UP (ref 0.5–1.3)
EGFR: 102 ML/MIN/1.73M2 — SIGNIFICANT CHANGE UP
EOSINOPHIL # BLD AUTO: 0.06 K/UL — SIGNIFICANT CHANGE UP (ref 0–0.5)
EOSINOPHIL NFR BLD AUTO: 0.4 % — SIGNIFICANT CHANGE UP (ref 0–6)
GLUCOSE SERPL-MCNC: 93 MG/DL — SIGNIFICANT CHANGE UP (ref 70–99)
HCT VFR BLD CALC: 32.2 % — LOW (ref 34.5–45)
HGB BLD-MCNC: 10.4 G/DL — LOW (ref 11.5–15.5)
IMM GRANULOCYTES NFR BLD AUTO: 2.1 % — HIGH (ref 0–0.9)
LYMPHOCYTES # BLD AUTO: 33.4 % — SIGNIFICANT CHANGE UP (ref 13–44)
LYMPHOCYTES # BLD AUTO: 4.53 K/UL — HIGH (ref 1–3.3)
MCHC RBC-ENTMCNC: 26.9 PG — LOW (ref 27–34)
MCHC RBC-ENTMCNC: 32.3 G/DL — SIGNIFICANT CHANGE UP (ref 32–36)
MCV RBC AUTO: 83.2 FL — SIGNIFICANT CHANGE UP (ref 80–100)
MONOCYTES # BLD AUTO: 1.01 K/UL — HIGH (ref 0–0.9)
MONOCYTES NFR BLD AUTO: 7.4 % — SIGNIFICANT CHANGE UP (ref 2–14)
NEUTROPHILS # BLD AUTO: 7.6 K/UL — HIGH (ref 1.8–7.4)
NEUTROPHILS NFR BLD AUTO: 56.1 % — SIGNIFICANT CHANGE UP (ref 43–77)
NRBC # BLD: 0 /100 WBCS — SIGNIFICANT CHANGE UP (ref 0–0)
PLATELET # BLD AUTO: 179 K/UL — SIGNIFICANT CHANGE UP (ref 150–400)
POTASSIUM SERPL-MCNC: 3.9 MMOL/L — SIGNIFICANT CHANGE UP (ref 3.5–5.3)
POTASSIUM SERPL-SCNC: 3.9 MMOL/L — SIGNIFICANT CHANGE UP (ref 3.5–5.3)
PROT SERPL-MCNC: 6.5 G/DL — SIGNIFICANT CHANGE UP (ref 6–8.3)
RBC # BLD: 3.87 M/UL — SIGNIFICANT CHANGE UP (ref 3.8–5.2)
RBC # FLD: 17.2 % — HIGH (ref 10.3–14.5)
SODIUM SERPL-SCNC: 142 MMOL/L — SIGNIFICANT CHANGE UP (ref 135–145)
TSH SERPL-MCNC: 1.75 UIU/ML — SIGNIFICANT CHANGE UP (ref 0.27–4.2)
WBC # BLD: 13.57 K/UL — HIGH (ref 3.8–10.5)
WBC # FLD AUTO: 13.57 K/UL — HIGH (ref 3.8–10.5)

## 2024-10-22 PROCEDURE — 99214 OFFICE O/P EST MOD 30 MIN: CPT

## 2024-10-22 PROCEDURE — G2211 COMPLEX E/M VISIT ADD ON: CPT | Mod: NC

## 2024-10-23 ENCOUNTER — APPOINTMENT (OUTPATIENT)
Dept: INFUSION THERAPY | Facility: HOSPITAL | Age: 59
End: 2024-10-23

## 2024-10-24 ENCOUNTER — APPOINTMENT (OUTPATIENT)
Dept: INFUSION THERAPY | Facility: HOSPITAL | Age: 59
End: 2024-10-24

## 2024-10-25 ENCOUNTER — APPOINTMENT (OUTPATIENT)
Dept: INFUSION THERAPY | Facility: HOSPITAL | Age: 59
End: 2024-10-25

## 2024-10-29 ENCOUNTER — APPOINTMENT (OUTPATIENT)
Dept: HEMATOLOGY ONCOLOGY | Facility: CLINIC | Age: 59
End: 2024-10-29
Payer: MEDICAID

## 2024-10-29 ENCOUNTER — RESULT REVIEW (OUTPATIENT)
Age: 59
End: 2024-10-29

## 2024-10-29 ENCOUNTER — APPOINTMENT (OUTPATIENT)
Dept: INFUSION THERAPY | Facility: HOSPITAL | Age: 59
End: 2024-10-29

## 2024-10-29 VITALS
OXYGEN SATURATION: 98 % | RESPIRATION RATE: 16 BRPM | TEMPERATURE: 97.9 F | SYSTOLIC BLOOD PRESSURE: 128 MMHG | HEART RATE: 75 BPM | DIASTOLIC BLOOD PRESSURE: 74 MMHG

## 2024-10-29 LAB
ALBUMIN SERPL ELPH-MCNC: 3.7 G/DL — SIGNIFICANT CHANGE UP (ref 3.3–5)
ALP SERPL-CCNC: 135 U/L — HIGH (ref 40–120)
ALT FLD-CCNC: 52 U/L — HIGH (ref 10–45)
ANION GAP SERPL CALC-SCNC: 11 MMOL/L — SIGNIFICANT CHANGE UP (ref 5–17)
AST SERPL-CCNC: 36 U/L — SIGNIFICANT CHANGE UP (ref 10–40)
BASOPHILS # BLD AUTO: 0.07 K/UL — SIGNIFICANT CHANGE UP (ref 0–0.2)
BASOPHILS NFR BLD AUTO: 1.1 % — SIGNIFICANT CHANGE UP (ref 0–2)
BILIRUB SERPL-MCNC: 0.4 MG/DL — SIGNIFICANT CHANGE UP (ref 0.2–1.2)
BUN SERPL-MCNC: 10 MG/DL — SIGNIFICANT CHANGE UP (ref 7–23)
CALCIUM SERPL-MCNC: 9.4 MG/DL — SIGNIFICANT CHANGE UP (ref 8.4–10.5)
CHLORIDE SERPL-SCNC: 105 MMOL/L — SIGNIFICANT CHANGE UP (ref 96–108)
CO2 SERPL-SCNC: 24 MMOL/L — SIGNIFICANT CHANGE UP (ref 22–31)
CREAT SERPL-MCNC: 0.52 MG/DL — SIGNIFICANT CHANGE UP (ref 0.5–1.3)
EGFR: 106 ML/MIN/1.73M2 — SIGNIFICANT CHANGE UP
EOSINOPHIL # BLD AUTO: 0.11 K/UL — SIGNIFICANT CHANGE UP (ref 0–0.5)
EOSINOPHIL NFR BLD AUTO: 1.7 % — SIGNIFICANT CHANGE UP (ref 0–6)
GLUCOSE SERPL-MCNC: 92 MG/DL — SIGNIFICANT CHANGE UP (ref 70–99)
HCT VFR BLD CALC: 31.3 % — LOW (ref 34.5–45)
HGB BLD-MCNC: 10.1 G/DL — LOW (ref 11.5–15.5)
IMM GRANULOCYTES NFR BLD AUTO: 1.2 % — HIGH (ref 0–0.9)
LYMPHOCYTES # BLD AUTO: 2.98 K/UL — SIGNIFICANT CHANGE UP (ref 1–3.3)
LYMPHOCYTES # BLD AUTO: 45.6 % — HIGH (ref 13–44)
MAGNESIUM SERPL-MCNC: 2.1 MG/DL — SIGNIFICANT CHANGE UP (ref 1.6–2.6)
MCHC RBC-ENTMCNC: 26.9 PG — LOW (ref 27–34)
MCHC RBC-ENTMCNC: 32.3 G/DL — SIGNIFICANT CHANGE UP (ref 32–36)
MCV RBC AUTO: 83.5 FL — SIGNIFICANT CHANGE UP (ref 80–100)
MONOCYTES # BLD AUTO: 0.7 K/UL — SIGNIFICANT CHANGE UP (ref 0–0.9)
MONOCYTES NFR BLD AUTO: 10.7 % — SIGNIFICANT CHANGE UP (ref 2–14)
NEUTROPHILS # BLD AUTO: 2.59 K/UL — SIGNIFICANT CHANGE UP (ref 1.8–7.4)
NEUTROPHILS NFR BLD AUTO: 39.7 % — LOW (ref 43–77)
NRBC # BLD: 0 /100 WBCS — SIGNIFICANT CHANGE UP (ref 0–0)
PLATELET # BLD AUTO: 153 K/UL — SIGNIFICANT CHANGE UP (ref 150–400)
POTASSIUM SERPL-MCNC: 3.7 MMOL/L — SIGNIFICANT CHANGE UP (ref 3.5–5.3)
POTASSIUM SERPL-SCNC: 3.7 MMOL/L — SIGNIFICANT CHANGE UP (ref 3.5–5.3)
PROT SERPL-MCNC: 6.3 G/DL — SIGNIFICANT CHANGE UP (ref 6–8.3)
RBC # BLD: 3.75 M/UL — LOW (ref 3.8–5.2)
RBC # FLD: 17.9 % — HIGH (ref 10.3–14.5)
SODIUM SERPL-SCNC: 141 MMOL/L — SIGNIFICANT CHANGE UP (ref 135–145)
WBC # BLD: 6.53 K/UL — SIGNIFICANT CHANGE UP (ref 3.8–10.5)
WBC # FLD AUTO: 6.53 K/UL — SIGNIFICANT CHANGE UP (ref 3.8–10.5)

## 2024-10-29 PROCEDURE — G2211 COMPLEX E/M VISIT ADD ON: CPT | Mod: NC

## 2024-10-29 PROCEDURE — 99214 OFFICE O/P EST MOD 30 MIN: CPT

## 2024-11-05 ENCOUNTER — APPOINTMENT (OUTPATIENT)
Dept: INFUSION THERAPY | Facility: HOSPITAL | Age: 59
End: 2024-11-05

## 2024-11-05 ENCOUNTER — RESULT REVIEW (OUTPATIENT)
Age: 59
End: 2024-11-05

## 2024-11-05 ENCOUNTER — APPOINTMENT (OUTPATIENT)
Dept: HEMATOLOGY ONCOLOGY | Facility: CLINIC | Age: 59
End: 2024-11-05
Payer: MEDICAID

## 2024-11-05 VITALS
OXYGEN SATURATION: 100 % | SYSTOLIC BLOOD PRESSURE: 118 MMHG | HEART RATE: 71 BPM | TEMPERATURE: 98.1 F | DIASTOLIC BLOOD PRESSURE: 73 MMHG | RESPIRATION RATE: 16 BRPM

## 2024-11-05 LAB
ALBUMIN SERPL ELPH-MCNC: 4.1 G/DL — SIGNIFICANT CHANGE UP (ref 3.3–5)
ALP SERPL-CCNC: 125 U/L — HIGH (ref 40–120)
ALT FLD-CCNC: 37 U/L — SIGNIFICANT CHANGE UP (ref 10–45)
ANION GAP SERPL CALC-SCNC: 9 MMOL/L — SIGNIFICANT CHANGE UP (ref 5–17)
AST SERPL-CCNC: 29 U/L — SIGNIFICANT CHANGE UP (ref 10–40)
BASOPHILS # BLD AUTO: 0.09 K/UL — SIGNIFICANT CHANGE UP (ref 0–0.2)
BASOPHILS NFR BLD AUTO: 1.5 % — SIGNIFICANT CHANGE UP (ref 0–2)
BILIRUB SERPL-MCNC: 0.4 MG/DL — SIGNIFICANT CHANGE UP (ref 0.2–1.2)
BUN SERPL-MCNC: 10 MG/DL — SIGNIFICANT CHANGE UP (ref 7–23)
CALCIUM SERPL-MCNC: 9.2 MG/DL — SIGNIFICANT CHANGE UP (ref 8.4–10.5)
CHLORIDE SERPL-SCNC: 105 MMOL/L — SIGNIFICANT CHANGE UP (ref 96–108)
CO2 SERPL-SCNC: 26 MMOL/L — SIGNIFICANT CHANGE UP (ref 22–31)
CREAT SERPL-MCNC: 0.55 MG/DL — SIGNIFICANT CHANGE UP (ref 0.5–1.3)
EGFR: 104 ML/MIN/1.73M2 — SIGNIFICANT CHANGE UP
EOSINOPHIL # BLD AUTO: 0.1 K/UL — SIGNIFICANT CHANGE UP (ref 0–0.5)
EOSINOPHIL NFR BLD AUTO: 1.7 % — SIGNIFICANT CHANGE UP (ref 0–6)
GLUCOSE SERPL-MCNC: 94 MG/DL — SIGNIFICANT CHANGE UP (ref 70–99)
HCT VFR BLD CALC: 32.3 % — LOW (ref 34.5–45)
HGB BLD-MCNC: 10.3 G/DL — LOW (ref 11.5–15.5)
IMM GRANULOCYTES NFR BLD AUTO: 3 % — HIGH (ref 0–0.9)
LYMPHOCYTES # BLD AUTO: 3.63 K/UL — HIGH (ref 1–3.3)
LYMPHOCYTES # BLD AUTO: 60.9 % — HIGH (ref 13–44)
MAGNESIUM SERPL-MCNC: 2.1 MG/DL — SIGNIFICANT CHANGE UP (ref 1.6–2.6)
MCHC RBC-ENTMCNC: 27 PG — SIGNIFICANT CHANGE UP (ref 27–34)
MCHC RBC-ENTMCNC: 31.9 G/DL — LOW (ref 32–36)
MCV RBC AUTO: 84.6 FL — SIGNIFICANT CHANGE UP (ref 80–100)
MONOCYTES # BLD AUTO: 0.58 K/UL — SIGNIFICANT CHANGE UP (ref 0–0.9)
MONOCYTES NFR BLD AUTO: 9.7 % — SIGNIFICANT CHANGE UP (ref 2–14)
NEUTROPHILS # BLD AUTO: 1.38 K/UL — LOW (ref 1.8–7.4)
NEUTROPHILS NFR BLD AUTO: 23.2 % — LOW (ref 43–77)
NRBC # BLD: 0 /100 WBCS — SIGNIFICANT CHANGE UP (ref 0–0)
PLATELET # BLD AUTO: 175 K/UL — SIGNIFICANT CHANGE UP (ref 150–400)
POTASSIUM SERPL-MCNC: 3.9 MMOL/L — SIGNIFICANT CHANGE UP (ref 3.5–5.3)
POTASSIUM SERPL-SCNC: 3.9 MMOL/L — SIGNIFICANT CHANGE UP (ref 3.5–5.3)
PROT SERPL-MCNC: 6.4 G/DL — SIGNIFICANT CHANGE UP (ref 6–8.3)
RBC # BLD: 3.82 M/UL — SIGNIFICANT CHANGE UP (ref 3.8–5.2)
RBC # FLD: 18.5 % — HIGH (ref 10.3–14.5)
SODIUM SERPL-SCNC: 141 MMOL/L — SIGNIFICANT CHANGE UP (ref 135–145)
WBC # BLD: 5.96 K/UL — SIGNIFICANT CHANGE UP (ref 3.8–10.5)
WBC # FLD AUTO: 5.96 K/UL — SIGNIFICANT CHANGE UP (ref 3.8–10.5)

## 2024-11-05 PROCEDURE — 99214 OFFICE O/P EST MOD 30 MIN: CPT

## 2024-11-05 PROCEDURE — G2211 COMPLEX E/M VISIT ADD ON: CPT | Mod: NC

## 2024-11-06 DIAGNOSIS — E86.0 DEHYDRATION: ICD-10-CM

## 2024-11-08 ENCOUNTER — APPOINTMENT (OUTPATIENT)
Dept: CARDIOLOGY | Facility: CLINIC | Age: 59
End: 2024-11-08
Payer: MEDICAID

## 2024-11-08 VITALS — SYSTOLIC BLOOD PRESSURE: 108 MMHG | DIASTOLIC BLOOD PRESSURE: 62 MMHG

## 2024-11-08 VITALS
HEIGHT: 67.52 IN | TEMPERATURE: 97 F | BODY MASS INDEX: 27.72 KG/M2 | HEART RATE: 79 BPM | SYSTOLIC BLOOD PRESSURE: 95 MMHG | OXYGEN SATURATION: 97 % | DIASTOLIC BLOOD PRESSURE: 58 MMHG | WEIGHT: 180.78 LBS

## 2024-11-08 DIAGNOSIS — Z85.3 PERSONAL HISTORY OF MALIGNANT NEOPLASM OF BREAST: ICD-10-CM

## 2024-11-08 DIAGNOSIS — E78.00 PURE HYPERCHOLESTEROLEMIA, UNSPECIFIED: ICD-10-CM

## 2024-11-08 DIAGNOSIS — R04.0 EPISTAXIS: ICD-10-CM

## 2024-11-08 DIAGNOSIS — Z91.89 OTHER SPECIFIED PERSONAL RISK FACTORS, NOT ELSEWHERE CLASSIFIED: ICD-10-CM

## 2024-11-08 PROCEDURE — 93010 ELECTROCARDIOGRAM REPORT: CPT

## 2024-11-08 PROCEDURE — 99215 OFFICE O/P EST HI 40 MIN: CPT | Mod: 25

## 2024-11-08 PROCEDURE — 93000 ELECTROCARDIOGRAM COMPLETE: CPT

## 2024-11-09 LAB
CHOLEST SERPL-MCNC: 143 MG/DL
ESTIMATED AVERAGE GLUCOSE: 128 MG/DL
HBA1C MFR BLD HPLC: 6.1 %
HDLC SERPL-MCNC: 40 MG/DL
INR PPP: 1.01 RATIO
LDLC SERPL CALC-MCNC: 85 MG/DL
NONHDLC SERPL-MCNC: 103 MG/DL
NT-PROBNP SERPL-MCNC: 97 PG/ML
PT BLD: 12 SEC
TRIGL SERPL-MCNC: 97 MG/DL
TROPONIN I SERPL-MCNC: <0.01 NG/ML
TROPONIN-T, HIGH SENSITIVITY: 11 NG/L

## 2024-11-12 ENCOUNTER — RESULT REVIEW (OUTPATIENT)
Age: 59
End: 2024-11-12

## 2024-11-12 ENCOUNTER — APPOINTMENT (OUTPATIENT)
Dept: HEMATOLOGY ONCOLOGY | Facility: CLINIC | Age: 59
End: 2024-11-12
Payer: MEDICAID

## 2024-11-12 ENCOUNTER — APPOINTMENT (OUTPATIENT)
Dept: INFUSION THERAPY | Facility: HOSPITAL | Age: 59
End: 2024-11-12

## 2024-11-12 VITALS
TEMPERATURE: 97.6 F | RESPIRATION RATE: 16 BRPM | HEART RATE: 71 BPM | OXYGEN SATURATION: 97 % | SYSTOLIC BLOOD PRESSURE: 124 MMHG | DIASTOLIC BLOOD PRESSURE: 75 MMHG

## 2024-11-12 DIAGNOSIS — G62.9 POLYNEUROPATHY, UNSPECIFIED: ICD-10-CM

## 2024-11-12 LAB
ALBUMIN SERPL ELPH-MCNC: 3.9 G/DL — SIGNIFICANT CHANGE UP (ref 3.3–5)
ALP SERPL-CCNC: 181 U/L — HIGH (ref 40–120)
ALT FLD-CCNC: 32 U/L — SIGNIFICANT CHANGE UP (ref 10–45)
ANION GAP SERPL CALC-SCNC: 11 MMOL/L — SIGNIFICANT CHANGE UP (ref 5–17)
AST SERPL-CCNC: 28 U/L — SIGNIFICANT CHANGE UP (ref 10–40)
BASOPHILS # BLD AUTO: 0.07 K/UL — SIGNIFICANT CHANGE UP (ref 0–0.2)
BASOPHILS NFR BLD AUTO: 0.3 % — SIGNIFICANT CHANGE UP (ref 0–2)
BILIRUB SERPL-MCNC: 0.4 MG/DL — SIGNIFICANT CHANGE UP (ref 0.2–1.2)
BUN SERPL-MCNC: 7 MG/DL — SIGNIFICANT CHANGE UP (ref 7–23)
CALCIUM SERPL-MCNC: 9.4 MG/DL — SIGNIFICANT CHANGE UP (ref 8.4–10.5)
CHLORIDE SERPL-SCNC: 105 MMOL/L — SIGNIFICANT CHANGE UP (ref 96–108)
CO2 SERPL-SCNC: 26 MMOL/L — SIGNIFICANT CHANGE UP (ref 22–31)
CREAT SERPL-MCNC: 0.56 MG/DL — SIGNIFICANT CHANGE UP (ref 0.5–1.3)
EGFR: 104 ML/MIN/1.73M2 — SIGNIFICANT CHANGE UP
EOSINOPHIL # BLD AUTO: 0.04 K/UL — SIGNIFICANT CHANGE UP (ref 0–0.5)
EOSINOPHIL NFR BLD AUTO: 0.2 % — SIGNIFICANT CHANGE UP (ref 0–6)
GLUCOSE SERPL-MCNC: 119 MG/DL — HIGH (ref 70–99)
HCT VFR BLD CALC: 33 % — LOW (ref 34.5–45)
HGB BLD-MCNC: 10.7 G/DL — LOW (ref 11.5–15.5)
IMM GRANULOCYTES NFR BLD AUTO: 3.2 % — HIGH (ref 0–0.9)
LYMPHOCYTES # BLD AUTO: 24.6 % — SIGNIFICANT CHANGE UP (ref 13–44)
LYMPHOCYTES # BLD AUTO: 5.53 K/UL — HIGH (ref 1–3.3)
MCHC RBC-ENTMCNC: 27.4 PG — SIGNIFICANT CHANGE UP (ref 27–34)
MCHC RBC-ENTMCNC: 32.4 G/DL — SIGNIFICANT CHANGE UP (ref 32–36)
MCV RBC AUTO: 84.6 FL — SIGNIFICANT CHANGE UP (ref 80–100)
MONOCYTES # BLD AUTO: 1.8 K/UL — HIGH (ref 0–0.9)
MONOCYTES NFR BLD AUTO: 8 % — SIGNIFICANT CHANGE UP (ref 2–14)
NEUTROPHILS # BLD AUTO: 14.29 K/UL — HIGH (ref 1.8–7.4)
NEUTROPHILS NFR BLD AUTO: 63.7 % — SIGNIFICANT CHANGE UP (ref 43–77)
NRBC # BLD: 0 /100 WBCS — SIGNIFICANT CHANGE UP (ref 0–0)
PLATELET # BLD AUTO: 193 K/UL — SIGNIFICANT CHANGE UP (ref 150–400)
POTASSIUM SERPL-MCNC: 3.6 MMOL/L — SIGNIFICANT CHANGE UP (ref 3.5–5.3)
POTASSIUM SERPL-SCNC: 3.6 MMOL/L — SIGNIFICANT CHANGE UP (ref 3.5–5.3)
PROT SERPL-MCNC: 6.5 G/DL — SIGNIFICANT CHANGE UP (ref 6–8.3)
RBC # BLD: 3.9 M/UL — SIGNIFICANT CHANGE UP (ref 3.8–5.2)
RBC # FLD: 19.9 % — HIGH (ref 10.3–14.5)
SODIUM SERPL-SCNC: 142 MMOL/L — SIGNIFICANT CHANGE UP (ref 135–145)
TSH SERPL-MCNC: 1.52 UIU/ML — SIGNIFICANT CHANGE UP (ref 0.27–4.2)
WBC # BLD: 22.44 K/UL — HIGH (ref 3.8–10.5)
WBC # FLD AUTO: 22.44 K/UL — HIGH (ref 3.8–10.5)

## 2024-11-12 PROCEDURE — G2211 COMPLEX E/M VISIT ADD ON: CPT | Mod: NC

## 2024-11-12 PROCEDURE — 99214 OFFICE O/P EST MOD 30 MIN: CPT

## 2024-11-12 RX ORDER — GABAPENTIN 300 MG/1
300 CAPSULE ORAL
Qty: 30 | Refills: 0 | Status: ACTIVE | COMMUNITY
Start: 2024-11-12 | End: 1900-01-01

## 2024-11-13 ENCOUNTER — NON-APPOINTMENT (OUTPATIENT)
Age: 59
End: 2024-11-13

## 2024-11-19 ENCOUNTER — APPOINTMENT (OUTPATIENT)
Dept: INFUSION THERAPY | Facility: HOSPITAL | Age: 59
End: 2024-11-19

## 2024-11-19 ENCOUNTER — RESULT REVIEW (OUTPATIENT)
Age: 59
End: 2024-11-19

## 2024-11-19 ENCOUNTER — APPOINTMENT (OUTPATIENT)
Dept: HEMATOLOGY ONCOLOGY | Facility: CLINIC | Age: 59
End: 2024-11-19
Payer: MEDICAID

## 2024-11-19 VITALS
RESPIRATION RATE: 16 BRPM | SYSTOLIC BLOOD PRESSURE: 121 MMHG | TEMPERATURE: 97.2 F | OXYGEN SATURATION: 99 % | DIASTOLIC BLOOD PRESSURE: 79 MMHG | HEART RATE: 71 BPM

## 2024-11-19 DIAGNOSIS — D72.820 LYMPHOCYTOSIS (SYMPTOMATIC): ICD-10-CM

## 2024-11-19 DIAGNOSIS — C50.911 MALIGNANT NEOPLASM OF UNSPECIFIED SITE OF RIGHT FEMALE BREAST: ICD-10-CM

## 2024-11-19 DIAGNOSIS — N60.92 UNSPECIFIED BENIGN MAMMARY DYSPLASIA OF LEFT BREAST: ICD-10-CM

## 2024-11-19 DIAGNOSIS — Z17.1 MALIGNANT NEOPLASM OF UNSPECIFIED SITE OF RIGHT FEMALE BREAST: ICD-10-CM

## 2024-11-19 DIAGNOSIS — Z51.11 ENCOUNTER FOR ANTINEOPLASTIC CHEMOTHERAPY: ICD-10-CM

## 2024-11-19 LAB
ALBUMIN SERPL ELPH-MCNC: 3.8 G/DL — SIGNIFICANT CHANGE UP (ref 3.3–5)
ALP SERPL-CCNC: 100 U/L — SIGNIFICANT CHANGE UP (ref 40–120)
ALT FLD-CCNC: 34 U/L — SIGNIFICANT CHANGE UP (ref 10–45)
ANION GAP SERPL CALC-SCNC: 10 MMOL/L — SIGNIFICANT CHANGE UP (ref 5–17)
AST SERPL-CCNC: 33 U/L — SIGNIFICANT CHANGE UP (ref 10–40)
BASOPHILS # BLD AUTO: 0.06 K/UL — SIGNIFICANT CHANGE UP (ref 0–0.2)
BASOPHILS NFR BLD AUTO: 1.3 % — SIGNIFICANT CHANGE UP (ref 0–2)
BILIRUB SERPL-MCNC: 0.7 MG/DL — SIGNIFICANT CHANGE UP (ref 0.2–1.2)
BUN SERPL-MCNC: 9 MG/DL — SIGNIFICANT CHANGE UP (ref 7–23)
CALCIUM SERPL-MCNC: 9.1 MG/DL — SIGNIFICANT CHANGE UP (ref 8.4–10.5)
CHLORIDE SERPL-SCNC: 106 MMOL/L — SIGNIFICANT CHANGE UP (ref 96–108)
CO2 SERPL-SCNC: 26 MMOL/L — SIGNIFICANT CHANGE UP (ref 22–31)
CREAT SERPL-MCNC: 0.53 MG/DL — SIGNIFICANT CHANGE UP (ref 0.5–1.3)
EGFR: 105 ML/MIN/1.73M2 — SIGNIFICANT CHANGE UP
EOSINOPHIL # BLD AUTO: 0.03 K/UL — SIGNIFICANT CHANGE UP (ref 0–0.5)
EOSINOPHIL NFR BLD AUTO: 0.6 % — SIGNIFICANT CHANGE UP (ref 0–6)
GLUCOSE SERPL-MCNC: 89 MG/DL — SIGNIFICANT CHANGE UP (ref 70–99)
HCT VFR BLD CALC: 30.6 % — LOW (ref 34.5–45)
HGB BLD-MCNC: 9.9 G/DL — LOW (ref 11.5–15.5)
IMM GRANULOCYTES NFR BLD AUTO: 0.6 % — SIGNIFICANT CHANGE UP (ref 0–0.9)
LYMPHOCYTES # BLD AUTO: 2.75 K/UL — SIGNIFICANT CHANGE UP (ref 1–3.3)
LYMPHOCYTES # BLD AUTO: 57.7 % — HIGH (ref 13–44)
MAGNESIUM SERPL-MCNC: 2.2 MG/DL — SIGNIFICANT CHANGE UP (ref 1.6–2.6)
MCHC RBC-ENTMCNC: 27.7 PG — SIGNIFICANT CHANGE UP (ref 27–34)
MCHC RBC-ENTMCNC: 32.4 G/DL — SIGNIFICANT CHANGE UP (ref 32–36)
MCV RBC AUTO: 85.7 FL — SIGNIFICANT CHANGE UP (ref 80–100)
MONOCYTES # BLD AUTO: 0.3 K/UL — SIGNIFICANT CHANGE UP (ref 0–0.9)
MONOCYTES NFR BLD AUTO: 6.3 % — SIGNIFICANT CHANGE UP (ref 2–14)
NEUTROPHILS # BLD AUTO: 1.6 K/UL — LOW (ref 1.8–7.4)
NEUTROPHILS NFR BLD AUTO: 33.5 % — LOW (ref 43–77)
NRBC # BLD: 0 /100 WBCS — SIGNIFICANT CHANGE UP (ref 0–0)
PLATELET # BLD AUTO: 182 K/UL — SIGNIFICANT CHANGE UP (ref 150–400)
POTASSIUM SERPL-MCNC: 3.8 MMOL/L — SIGNIFICANT CHANGE UP (ref 3.5–5.3)
POTASSIUM SERPL-SCNC: 3.8 MMOL/L — SIGNIFICANT CHANGE UP (ref 3.5–5.3)
PROT SERPL-MCNC: 6.3 G/DL — SIGNIFICANT CHANGE UP (ref 6–8.3)
RBC # BLD: 3.57 M/UL — LOW (ref 3.8–5.2)
RBC # FLD: 20 % — HIGH (ref 10.3–14.5)
SODIUM SERPL-SCNC: 141 MMOL/L — SIGNIFICANT CHANGE UP (ref 135–145)
WBC # BLD: 4.77 K/UL — SIGNIFICANT CHANGE UP (ref 3.8–10.5)
WBC # FLD AUTO: 4.77 K/UL — SIGNIFICANT CHANGE UP (ref 3.8–10.5)

## 2024-11-19 PROCEDURE — G2211 COMPLEX E/M VISIT ADD ON: CPT | Mod: NC

## 2024-11-19 PROCEDURE — 99214 OFFICE O/P EST MOD 30 MIN: CPT

## 2024-11-26 ENCOUNTER — RESULT REVIEW (OUTPATIENT)
Age: 59
End: 2024-11-26

## 2024-11-26 ENCOUNTER — APPOINTMENT (OUTPATIENT)
Dept: INFUSION THERAPY | Facility: HOSPITAL | Age: 59
End: 2024-11-26

## 2024-11-26 ENCOUNTER — APPOINTMENT (OUTPATIENT)
Dept: HEMATOLOGY ONCOLOGY | Facility: CLINIC | Age: 59
End: 2024-11-26

## 2024-11-26 LAB
ALBUMIN SERPL ELPH-MCNC: 3.9 G/DL — SIGNIFICANT CHANGE UP (ref 3.3–5)
ALP SERPL-CCNC: 179 U/L — HIGH (ref 40–120)
ALT FLD-CCNC: 29 U/L — SIGNIFICANT CHANGE UP (ref 10–45)
ANION GAP SERPL CALC-SCNC: 10 MMOL/L — SIGNIFICANT CHANGE UP (ref 5–17)
ANISOCYTOSIS BLD QL: SIGNIFICANT CHANGE UP
AST SERPL-CCNC: 31 U/L — SIGNIFICANT CHANGE UP (ref 10–40)
BASOPHILS # BLD AUTO: 0 K/UL — SIGNIFICANT CHANGE UP (ref 0–0.2)
BASOPHILS NFR BLD AUTO: 0 % — SIGNIFICANT CHANGE UP (ref 0–2)
BILIRUB SERPL-MCNC: 0.4 MG/DL — SIGNIFICANT CHANGE UP (ref 0.2–1.2)
BUN SERPL-MCNC: 13 MG/DL — SIGNIFICANT CHANGE UP (ref 7–23)
BURR CELLS BLD QL SMEAR: PRESENT — SIGNIFICANT CHANGE UP
CALCIUM SERPL-MCNC: 9.6 MG/DL — SIGNIFICANT CHANGE UP (ref 8.4–10.5)
CHLORIDE SERPL-SCNC: 104 MMOL/L — SIGNIFICANT CHANGE UP (ref 96–108)
CO2 SERPL-SCNC: 26 MMOL/L — SIGNIFICANT CHANGE UP (ref 22–31)
CREAT SERPL-MCNC: 0.62 MG/DL — SIGNIFICANT CHANGE UP (ref 0.5–1.3)
DACRYOCYTES BLD QL SMEAR: SLIGHT — SIGNIFICANT CHANGE UP
EGFR: 101 ML/MIN/1.73M2 — SIGNIFICANT CHANGE UP
EOSINOPHIL # BLD AUTO: 0.87 K/UL — HIGH (ref 0–0.5)
EOSINOPHIL NFR BLD AUTO: 3 % — SIGNIFICANT CHANGE UP (ref 0–6)
GLUCOSE SERPL-MCNC: 92 MG/DL — SIGNIFICANT CHANGE UP (ref 70–99)
HCT VFR BLD CALC: 32.3 % — LOW (ref 34.5–45)
HGB BLD-MCNC: 10.4 G/DL — LOW (ref 11.5–15.5)
LG PLATELETS BLD QL AUTO: SLIGHT — SIGNIFICANT CHANGE UP
LYMPHOCYTES # BLD AUTO: 20 % — SIGNIFICANT CHANGE UP (ref 13–44)
LYMPHOCYTES # BLD AUTO: 5.78 K/UL — HIGH (ref 1–3.3)
MAGNESIUM SERPL-MCNC: 2.1 MG/DL — SIGNIFICANT CHANGE UP (ref 1.6–2.6)
MCHC RBC-ENTMCNC: 28.5 PG — SIGNIFICANT CHANGE UP (ref 27–34)
MCHC RBC-ENTMCNC: 32.2 G/DL — SIGNIFICANT CHANGE UP (ref 32–36)
MCV RBC AUTO: 88.5 FL — SIGNIFICANT CHANGE UP (ref 80–100)
METAMYELOCYTES # FLD: 2 % — HIGH (ref 0–0)
MONOCYTES # BLD AUTO: 3.18 K/UL — HIGH (ref 0–0.9)
MONOCYTES NFR BLD AUTO: 11 % — SIGNIFICANT CHANGE UP (ref 2–14)
MYELOCYTES NFR BLD: 3 % — HIGH (ref 0–0)
NEUTROPHILS # BLD AUTO: 17.62 K/UL — HIGH (ref 1.8–7.4)
NEUTROPHILS NFR BLD AUTO: 61 % — SIGNIFICANT CHANGE UP (ref 43–77)
NRBC # BLD: 0 /100 WBCS — SIGNIFICANT CHANGE UP (ref 0–0)
NRBC # BLD: SIGNIFICANT CHANGE UP /100 WBCS (ref 0–0)
PLAT MORPH BLD: ABNORMAL
PLATELET # BLD AUTO: 221 K/UL — SIGNIFICANT CHANGE UP (ref 150–400)
POIKILOCYTOSIS BLD QL AUTO: SLIGHT — SIGNIFICANT CHANGE UP
POTASSIUM SERPL-MCNC: 3.8 MMOL/L — SIGNIFICANT CHANGE UP (ref 3.5–5.3)
POTASSIUM SERPL-SCNC: 3.8 MMOL/L — SIGNIFICANT CHANGE UP (ref 3.5–5.3)
PROT SERPL-MCNC: 6.6 G/DL — SIGNIFICANT CHANGE UP (ref 6–8.3)
RBC # BLD: 3.65 M/UL — LOW (ref 3.8–5.2)
RBC # FLD: 20.8 % — HIGH (ref 10.3–14.5)
RBC BLD AUTO: ABNORMAL
SCHISTOCYTES BLD QL AUTO: SLIGHT — SIGNIFICANT CHANGE UP
SODIUM SERPL-SCNC: 140 MMOL/L — SIGNIFICANT CHANGE UP (ref 135–145)
TOXIC GRANULES BLD QL SMEAR: PRESENT — SIGNIFICANT CHANGE UP
WBC # BLD: 28.89 K/UL — HIGH (ref 3.8–10.5)
WBC # FLD AUTO: 28.89 K/UL — HIGH (ref 3.8–10.5)

## 2024-11-27 ENCOUNTER — NON-APPOINTMENT (OUTPATIENT)
Age: 59
End: 2024-11-27

## 2024-12-03 ENCOUNTER — APPOINTMENT (OUTPATIENT)
Dept: INFUSION THERAPY | Facility: HOSPITAL | Age: 59
End: 2024-12-03

## 2024-12-03 ENCOUNTER — RESULT REVIEW (OUTPATIENT)
Age: 59
End: 2024-12-03

## 2024-12-03 ENCOUNTER — APPOINTMENT (OUTPATIENT)
Dept: HEMATOLOGY ONCOLOGY | Facility: CLINIC | Age: 59
End: 2024-12-03
Payer: MEDICAID

## 2024-12-03 VITALS
HEART RATE: 70 BPM | WEIGHT: 182.3 LBS | TEMPERATURE: 98 F | HEIGHT: 67.52 IN | RESPIRATION RATE: 17 BRPM | DIASTOLIC BLOOD PRESSURE: 70 MMHG | BODY MASS INDEX: 27.95 KG/M2 | OXYGEN SATURATION: 98 % | SYSTOLIC BLOOD PRESSURE: 110 MMHG

## 2024-12-03 DIAGNOSIS — Z17.1 MALIGNANT NEOPLASM OF UNSPECIFIED SITE OF RIGHT FEMALE BREAST: ICD-10-CM

## 2024-12-03 DIAGNOSIS — Z51.11 ENCOUNTER FOR ANTINEOPLASTIC CHEMOTHERAPY: ICD-10-CM

## 2024-12-03 DIAGNOSIS — C50.911 MALIGNANT NEOPLASM OF UNSPECIFIED SITE OF RIGHT FEMALE BREAST: ICD-10-CM

## 2024-12-03 DIAGNOSIS — D72.820 LYMPHOCYTOSIS (SYMPTOMATIC): ICD-10-CM

## 2024-12-03 DIAGNOSIS — N60.92 UNSPECIFIED BENIGN MAMMARY DYSPLASIA OF LEFT BREAST: ICD-10-CM

## 2024-12-03 LAB
BASOPHILS # BLD AUTO: 0.06 K/UL — SIGNIFICANT CHANGE UP (ref 0–0.2)
BASOPHILS NFR BLD AUTO: 1.1 % — SIGNIFICANT CHANGE UP (ref 0–2)
EOSINOPHIL # BLD AUTO: 0.05 K/UL — SIGNIFICANT CHANGE UP (ref 0–0.5)
EOSINOPHIL NFR BLD AUTO: 0.9 % — SIGNIFICANT CHANGE UP (ref 0–6)
HCT VFR BLD CALC: 32.2 % — LOW (ref 34.5–45)
HGB BLD-MCNC: 10.2 G/DL — LOW (ref 11.5–15.5)
IMM GRANULOCYTES NFR BLD AUTO: 0.6 % — SIGNIFICANT CHANGE UP (ref 0–0.9)
LYMPHOCYTES # BLD AUTO: 3.53 K/UL — HIGH (ref 1–3.3)
LYMPHOCYTES # BLD AUTO: 66.1 % — HIGH (ref 13–44)
MCHC RBC-ENTMCNC: 27.8 PG — SIGNIFICANT CHANGE UP (ref 27–34)
MCHC RBC-ENTMCNC: 31.7 G/DL — LOW (ref 32–36)
MCV RBC AUTO: 87.7 FL — SIGNIFICANT CHANGE UP (ref 80–100)
MONOCYTES # BLD AUTO: 0.38 K/UL — SIGNIFICANT CHANGE UP (ref 0–0.9)
MONOCYTES NFR BLD AUTO: 7.1 % — SIGNIFICANT CHANGE UP (ref 2–14)
NEUTROPHILS # BLD AUTO: 1.29 K/UL — LOW (ref 1.8–7.4)
NEUTROPHILS NFR BLD AUTO: 24.2 % — LOW (ref 43–77)
NRBC # BLD: 0 /100 WBCS — SIGNIFICANT CHANGE UP (ref 0–0)
PLATELET # BLD AUTO: 162 K/UL — SIGNIFICANT CHANGE UP (ref 150–400)
RBC # BLD: 3.67 M/UL — LOW (ref 3.8–5.2)
RBC # FLD: 20.4 % — HIGH (ref 10.3–14.5)
WBC # BLD: 5.34 K/UL — SIGNIFICANT CHANGE UP (ref 3.8–10.5)
WBC # FLD AUTO: 5.34 K/UL — SIGNIFICANT CHANGE UP (ref 3.8–10.5)

## 2024-12-03 PROCEDURE — 99215 OFFICE O/P EST HI 40 MIN: CPT

## 2024-12-03 PROCEDURE — G2211 COMPLEX E/M VISIT ADD ON: CPT | Mod: NC

## 2024-12-04 ENCOUNTER — RX RENEWAL (OUTPATIENT)
Age: 59
End: 2024-12-04

## 2024-12-05 ENCOUNTER — OUTPATIENT (OUTPATIENT)
Dept: OUTPATIENT SERVICES | Facility: HOSPITAL | Age: 59
LOS: 1 days | Discharge: HOME | End: 2024-12-05

## 2024-12-05 DIAGNOSIS — Z98.890 OTHER SPECIFIED POSTPROCEDURAL STATES: Chronic | ICD-10-CM

## 2024-12-05 DIAGNOSIS — D64.9 ANEMIA, UNSPECIFIED: ICD-10-CM

## 2024-12-10 ENCOUNTER — RESULT REVIEW (OUTPATIENT)
Age: 59
End: 2024-12-10

## 2024-12-10 ENCOUNTER — APPOINTMENT (OUTPATIENT)
Dept: INFUSION THERAPY | Facility: HOSPITAL | Age: 59
End: 2024-12-10

## 2024-12-10 ENCOUNTER — NON-APPOINTMENT (OUTPATIENT)
Age: 59
End: 2024-12-10

## 2024-12-10 ENCOUNTER — APPOINTMENT (OUTPATIENT)
Dept: HEMATOLOGY ONCOLOGY | Facility: CLINIC | Age: 59
End: 2024-12-10

## 2024-12-10 LAB
ALBUMIN SERPL ELPH-MCNC: 3.9 G/DL — SIGNIFICANT CHANGE UP (ref 3.3–5)
ALP SERPL-CCNC: 121 U/L — HIGH (ref 40–120)
ALT FLD-CCNC: 27 U/L — SIGNIFICANT CHANGE UP (ref 10–45)
ANION GAP SERPL CALC-SCNC: 11 MMOL/L — SIGNIFICANT CHANGE UP (ref 5–17)
ANISOCYTOSIS BLD QL: SLIGHT — SIGNIFICANT CHANGE UP
AST SERPL-CCNC: 31 U/L — SIGNIFICANT CHANGE UP (ref 10–40)
BASOPHILS # BLD AUTO: 0 K/UL — SIGNIFICANT CHANGE UP (ref 0–0.2)
BASOPHILS NFR BLD AUTO: 0 % — SIGNIFICANT CHANGE UP (ref 0–2)
BILIRUB SERPL-MCNC: 0.4 MG/DL — SIGNIFICANT CHANGE UP (ref 0.2–1.2)
BUN SERPL-MCNC: 10 MG/DL — SIGNIFICANT CHANGE UP (ref 7–23)
CALCIUM SERPL-MCNC: 9.2 MG/DL — SIGNIFICANT CHANGE UP (ref 8.4–10.5)
CHLORIDE SERPL-SCNC: 105 MMOL/L — SIGNIFICANT CHANGE UP (ref 96–108)
CO2 SERPL-SCNC: 26 MMOL/L — SIGNIFICANT CHANGE UP (ref 22–31)
CREAT SERPL-MCNC: 0.59 MG/DL — SIGNIFICANT CHANGE UP (ref 0.5–1.3)
DACRYOCYTES BLD QL SMEAR: SLIGHT — SIGNIFICANT CHANGE UP
EGFR: 102 ML/MIN/1.73M2 — SIGNIFICANT CHANGE UP
ELLIPTOCYTES BLD QL SMEAR: SLIGHT — SIGNIFICANT CHANGE UP
EOSINOPHIL # BLD AUTO: 0.18 K/UL — SIGNIFICANT CHANGE UP (ref 0–0.5)
EOSINOPHIL NFR BLD AUTO: 2 % — SIGNIFICANT CHANGE UP (ref 0–6)
GLUCOSE SERPL-MCNC: 107 MG/DL — HIGH (ref 70–99)
HCT VFR BLD CALC: 35.3 % — SIGNIFICANT CHANGE UP (ref 34.5–45)
HGB BLD-MCNC: 11.2 G/DL — LOW (ref 11.5–15.5)
LYMPHOCYTES # BLD AUTO: 4.11 K/UL — HIGH (ref 1–3.3)
LYMPHOCYTES # BLD AUTO: 46 % — HIGH (ref 13–44)
MCHC RBC-ENTMCNC: 27.9 PG — SIGNIFICANT CHANGE UP (ref 27–34)
MCHC RBC-ENTMCNC: 31.7 G/DL — LOW (ref 32–36)
MCV RBC AUTO: 87.8 FL — SIGNIFICANT CHANGE UP (ref 80–100)
METAMYELOCYTES # FLD: 2 % — HIGH (ref 0–0)
METAMYELOCYTES NFR BLD: 2 % — HIGH (ref 0–0)
MONOCYTES # BLD AUTO: 1.7 K/UL — HIGH (ref 0–0.9)
MONOCYTES NFR BLD AUTO: 19 % — HIGH (ref 2–14)
MYELOCYTES NFR BLD: 4 % — HIGH (ref 0–0)
NEUTROPHILS # BLD AUTO: 2.41 K/UL — SIGNIFICANT CHANGE UP (ref 1.8–7.4)
NEUTROPHILS NFR BLD AUTO: 27 % — LOW (ref 43–77)
NRBC # BLD: 0 /100 WBCS — SIGNIFICANT CHANGE UP (ref 0–0)
NRBC # BLD: SIGNIFICANT CHANGE UP /100 WBCS (ref 0–0)
NRBC BLD-RTO: 0 /100 WBCS — SIGNIFICANT CHANGE UP (ref 0–0)
NRBC BLD-RTO: SIGNIFICANT CHANGE UP /100 WBCS (ref 0–0)
PLAT MORPH BLD: NORMAL — SIGNIFICANT CHANGE UP
PLATELET # BLD AUTO: 200 K/UL — SIGNIFICANT CHANGE UP (ref 150–400)
POIKILOCYTOSIS BLD QL AUTO: SLIGHT — SIGNIFICANT CHANGE UP
POLYCHROMASIA BLD QL SMEAR: SLIGHT — SIGNIFICANT CHANGE UP
POTASSIUM SERPL-MCNC: 3.6 MMOL/L — SIGNIFICANT CHANGE UP (ref 3.5–5.3)
POTASSIUM SERPL-SCNC: 3.6 MMOL/L — SIGNIFICANT CHANGE UP (ref 3.5–5.3)
PROT SERPL-MCNC: 6.4 G/DL — SIGNIFICANT CHANGE UP (ref 6–8.3)
RBC # BLD: 4.02 M/UL — SIGNIFICANT CHANGE UP (ref 3.8–5.2)
RBC # FLD: 20.2 % — HIGH (ref 10.3–14.5)
RBC BLD AUTO: ABNORMAL
SODIUM SERPL-SCNC: 142 MMOL/L — SIGNIFICANT CHANGE UP (ref 135–145)
T4 FREE+ TSH PNL SERPL: 0.79 UIU/ML — SIGNIFICANT CHANGE UP (ref 0.27–4.2)
WBC # BLD: 8.93 K/UL — SIGNIFICANT CHANGE UP (ref 3.8–10.5)
WBC # FLD AUTO: 8.93 K/UL — SIGNIFICANT CHANGE UP (ref 3.8–10.5)

## 2024-12-11 DIAGNOSIS — R11.2 NAUSEA WITH VOMITING, UNSPECIFIED: ICD-10-CM

## 2024-12-11 DIAGNOSIS — Z51.89 ENCOUNTER FOR OTHER SPECIFIED AFTERCARE: ICD-10-CM

## 2024-12-11 DIAGNOSIS — E86.0 DEHYDRATION: ICD-10-CM

## 2024-12-11 DIAGNOSIS — C50.919 MALIGNANT NEOPLASM OF UNSPECIFIED SITE OF UNSPECIFIED FEMALE BREAST: ICD-10-CM

## 2024-12-11 DIAGNOSIS — Z51.11 ENCOUNTER FOR ANTINEOPLASTIC CHEMOTHERAPY: ICD-10-CM

## 2024-12-13 ENCOUNTER — NON-APPOINTMENT (OUTPATIENT)
Age: 59
End: 2024-12-13

## 2024-12-16 ENCOUNTER — APPOINTMENT (OUTPATIENT)
Dept: CARDIOLOGY | Facility: CLINIC | Age: 59
End: 2024-12-16

## 2024-12-16 VITALS
DIASTOLIC BLOOD PRESSURE: 72 MMHG | OXYGEN SATURATION: 98 % | SYSTOLIC BLOOD PRESSURE: 106 MMHG | HEART RATE: 86 BPM | WEIGHT: 183.64 LBS | TEMPERATURE: 97.3 F | BODY MASS INDEX: 28.32 KG/M2

## 2024-12-16 DIAGNOSIS — Z91.89 OTHER SPECIFIED PERSONAL RISK FACTORS, NOT ELSEWHERE CLASSIFIED: ICD-10-CM

## 2024-12-16 DIAGNOSIS — E78.00 PURE HYPERCHOLESTEROLEMIA, UNSPECIFIED: ICD-10-CM

## 2024-12-16 DIAGNOSIS — Z85.3 PERSONAL HISTORY OF MALIGNANT NEOPLASM OF BREAST: ICD-10-CM

## 2024-12-16 DIAGNOSIS — Z17.1 MALIGNANT NEOPLASM OF UNSPECIFIED SITE OF RIGHT FEMALE BREAST: ICD-10-CM

## 2024-12-16 DIAGNOSIS — Z51.12 ENCOUNTER FOR ANTINEOPLASTIC IMMUNOTHERAPY: ICD-10-CM

## 2024-12-16 DIAGNOSIS — C50.911 MALIGNANT NEOPLASM OF UNSPECIFIED SITE OF RIGHT FEMALE BREAST: ICD-10-CM

## 2024-12-16 DIAGNOSIS — Z45.2 ENCOUNTER FOR ADJUSTMENT AND MANAGEMENT OF VASCULAR ACCESS DEVICE: ICD-10-CM

## 2024-12-16 PROCEDURE — 93000 ELECTROCARDIOGRAM COMPLETE: CPT

## 2024-12-16 PROCEDURE — 93010 ELECTROCARDIOGRAM REPORT: CPT

## 2024-12-16 PROCEDURE — 99215 OFFICE O/P EST HI 40 MIN: CPT | Mod: 25

## 2024-12-31 ENCOUNTER — APPOINTMENT (OUTPATIENT)
Dept: INFUSION THERAPY | Facility: HOSPITAL | Age: 59
End: 2024-12-31

## 2024-12-31 ENCOUNTER — RESULT REVIEW (OUTPATIENT)
Age: 59
End: 2024-12-31

## 2024-12-31 ENCOUNTER — APPOINTMENT (OUTPATIENT)
Dept: HEMATOLOGY ONCOLOGY | Facility: CLINIC | Age: 59
End: 2024-12-31

## 2024-12-31 LAB
ALBUMIN SERPL ELPH-MCNC: 4 G/DL — SIGNIFICANT CHANGE UP (ref 3.3–5)
ALP SERPL-CCNC: 94 U/L — SIGNIFICANT CHANGE UP (ref 40–120)
ALT FLD-CCNC: 28 U/L — SIGNIFICANT CHANGE UP (ref 10–45)
ANION GAP SERPL CALC-SCNC: 10 MMOL/L — SIGNIFICANT CHANGE UP (ref 5–17)
AST SERPL-CCNC: 32 U/L — SIGNIFICANT CHANGE UP (ref 10–40)
BASOPHILS # BLD AUTO: 0.04 K/UL — SIGNIFICANT CHANGE UP (ref 0–0.2)
BASOPHILS NFR BLD AUTO: 0.8 % — SIGNIFICANT CHANGE UP (ref 0–2)
BILIRUB SERPL-MCNC: 0.4 MG/DL — SIGNIFICANT CHANGE UP (ref 0.2–1.2)
BUN SERPL-MCNC: 12 MG/DL — SIGNIFICANT CHANGE UP (ref 7–23)
CALCIUM SERPL-MCNC: 9.1 MG/DL — SIGNIFICANT CHANGE UP (ref 8.4–10.5)
CHLORIDE SERPL-SCNC: 107 MMOL/L — SIGNIFICANT CHANGE UP (ref 96–108)
CO2 SERPL-SCNC: 26 MMOL/L — SIGNIFICANT CHANGE UP (ref 22–31)
CREAT SERPL-MCNC: 0.54 MG/DL — SIGNIFICANT CHANGE UP (ref 0.5–1.3)
EGFR: 105 ML/MIN/1.73M2 — SIGNIFICANT CHANGE UP
EOSINOPHIL # BLD AUTO: 0.05 K/UL — SIGNIFICANT CHANGE UP (ref 0–0.5)
EOSINOPHIL NFR BLD AUTO: 0.9 % — SIGNIFICANT CHANGE UP (ref 0–6)
GLUCOSE SERPL-MCNC: 144 MG/DL — HIGH (ref 70–99)
HCT VFR BLD CALC: 34 % — LOW (ref 34.5–45)
HGB BLD-MCNC: 10.8 G/DL — LOW (ref 11.5–15.5)
IMM GRANULOCYTES NFR BLD AUTO: 0.4 % — SIGNIFICANT CHANGE UP (ref 0–0.9)
LYMPHOCYTES # BLD AUTO: 2.29 K/UL — SIGNIFICANT CHANGE UP (ref 1–3.3)
LYMPHOCYTES # BLD AUTO: 43.2 % — SIGNIFICANT CHANGE UP (ref 13–44)
MCHC RBC-ENTMCNC: 27.9 PG — SIGNIFICANT CHANGE UP (ref 27–34)
MCHC RBC-ENTMCNC: 31.8 G/DL — LOW (ref 32–36)
MCV RBC AUTO: 87.9 FL — SIGNIFICANT CHANGE UP (ref 80–100)
MONOCYTES # BLD AUTO: 0.8 K/UL — SIGNIFICANT CHANGE UP (ref 0–0.9)
MONOCYTES NFR BLD AUTO: 15.1 % — HIGH (ref 2–14)
NEUTROPHILS # BLD AUTO: 2.1 K/UL — SIGNIFICANT CHANGE UP (ref 1.8–7.4)
NEUTROPHILS NFR BLD AUTO: 39.6 % — LOW (ref 43–77)
NRBC # BLD: 0 /100 WBCS — SIGNIFICANT CHANGE UP (ref 0–0)
NRBC BLD-RTO: 0 /100 WBCS — SIGNIFICANT CHANGE UP (ref 0–0)
PLATELET # BLD AUTO: 335 K/UL — SIGNIFICANT CHANGE UP (ref 150–400)
POTASSIUM SERPL-MCNC: 3.3 MMOL/L — LOW (ref 3.5–5.3)
POTASSIUM SERPL-SCNC: 3.3 MMOL/L — LOW (ref 3.5–5.3)
PROT SERPL-MCNC: 6.5 G/DL — SIGNIFICANT CHANGE UP (ref 6–8.3)
RBC # BLD: 3.87 M/UL — SIGNIFICANT CHANGE UP (ref 3.8–5.2)
RBC # FLD: 18.7 % — HIGH (ref 10.3–14.5)
SODIUM SERPL-SCNC: 143 MMOL/L — SIGNIFICANT CHANGE UP (ref 135–145)
WBC # BLD: 5.3 K/UL — SIGNIFICANT CHANGE UP (ref 3.8–10.5)
WBC # FLD AUTO: 5.3 K/UL — SIGNIFICANT CHANGE UP (ref 3.8–10.5)

## 2024-12-31 RX ORDER — POTASSIUM CHLORIDE 1500 MG/1
20 TABLET, FILM COATED, EXTENDED RELEASE ORAL
Qty: 7 | Refills: 0 | Status: ACTIVE | COMMUNITY
Start: 2024-12-31 | End: 1900-01-01

## 2025-01-01 LAB — T4 FREE+ TSH PNL SERPL: 1.09 UIU/ML — SIGNIFICANT CHANGE UP (ref 0.27–4.2)

## 2025-01-10 ENCOUNTER — NON-APPOINTMENT (OUTPATIENT)
Age: 60
End: 2025-01-10

## 2025-01-21 ENCOUNTER — RESULT REVIEW (OUTPATIENT)
Age: 60
End: 2025-01-21

## 2025-01-21 ENCOUNTER — APPOINTMENT (OUTPATIENT)
Dept: HEMATOLOGY ONCOLOGY | Facility: CLINIC | Age: 60
End: 2025-01-21
Payer: MEDICAID

## 2025-01-21 ENCOUNTER — NON-APPOINTMENT (OUTPATIENT)
Age: 60
End: 2025-01-21

## 2025-01-21 ENCOUNTER — APPOINTMENT (OUTPATIENT)
Dept: INFUSION THERAPY | Facility: HOSPITAL | Age: 60
End: 2025-01-21

## 2025-01-21 ENCOUNTER — APPOINTMENT (OUTPATIENT)
Dept: HEMATOLOGY ONCOLOGY | Facility: CLINIC | Age: 60
End: 2025-01-21

## 2025-01-21 VITALS
DIASTOLIC BLOOD PRESSURE: 71 MMHG | RESPIRATION RATE: 16 BRPM | HEART RATE: 76 BPM | SYSTOLIC BLOOD PRESSURE: 121 MMHG | TEMPERATURE: 97.9 F | OXYGEN SATURATION: 98 %

## 2025-01-21 DIAGNOSIS — Z51.11 ENCOUNTER FOR ANTINEOPLASTIC CHEMOTHERAPY: ICD-10-CM

## 2025-01-21 DIAGNOSIS — N60.92 UNSPECIFIED BENIGN MAMMARY DYSPLASIA OF LEFT BREAST: ICD-10-CM

## 2025-01-21 DIAGNOSIS — D72.820 LYMPHOCYTOSIS (SYMPTOMATIC): ICD-10-CM

## 2025-01-21 LAB
ALBUMIN SERPL ELPH-MCNC: 3.9 G/DL — SIGNIFICANT CHANGE UP (ref 3.3–5)
ALP SERPL-CCNC: 81 U/L — SIGNIFICANT CHANGE UP (ref 40–120)
ALT FLD-CCNC: 26 U/L — SIGNIFICANT CHANGE UP (ref 10–45)
ANION GAP SERPL CALC-SCNC: 8 MMOL/L — SIGNIFICANT CHANGE UP (ref 5–17)
AST SERPL-CCNC: 27 U/L — SIGNIFICANT CHANGE UP (ref 10–40)
BASOPHILS # BLD AUTO: 0.11 K/UL — SIGNIFICANT CHANGE UP (ref 0–0.2)
BASOPHILS NFR BLD AUTO: 1.8 % — SIGNIFICANT CHANGE UP (ref 0–2)
BILIRUB SERPL-MCNC: 0.3 MG/DL — SIGNIFICANT CHANGE UP (ref 0.2–1.2)
BUN SERPL-MCNC: 12 MG/DL — SIGNIFICANT CHANGE UP (ref 7–23)
CALCIUM SERPL-MCNC: 9.1 MG/DL — SIGNIFICANT CHANGE UP (ref 8.4–10.5)
CHLORIDE SERPL-SCNC: 107 MMOL/L — SIGNIFICANT CHANGE UP (ref 96–108)
CO2 SERPL-SCNC: 29 MMOL/L — SIGNIFICANT CHANGE UP (ref 22–31)
CREAT SERPL-MCNC: 0.61 MG/DL — SIGNIFICANT CHANGE UP (ref 0.5–1.3)
EGFR: 102 ML/MIN/1.73M2 — SIGNIFICANT CHANGE UP
EOSINOPHIL # BLD AUTO: 0.12 K/UL — SIGNIFICANT CHANGE UP (ref 0–0.5)
EOSINOPHIL NFR BLD AUTO: 1.9 % — SIGNIFICANT CHANGE UP (ref 0–6)
GLUCOSE SERPL-MCNC: 124 MG/DL — HIGH (ref 70–99)
HCT VFR BLD CALC: 35.2 % — SIGNIFICANT CHANGE UP (ref 34.5–45)
HGB BLD-MCNC: 11.1 G/DL — LOW (ref 11.5–15.5)
IMM GRANULOCYTES NFR BLD AUTO: 0.3 % — SIGNIFICANT CHANGE UP (ref 0–0.9)
LYMPHOCYTES # BLD AUTO: 2.45 K/UL — SIGNIFICANT CHANGE UP (ref 1–3.3)
LYMPHOCYTES # BLD AUTO: 39.2 % — SIGNIFICANT CHANGE UP (ref 13–44)
MCHC RBC-ENTMCNC: 28 PG — SIGNIFICANT CHANGE UP (ref 27–34)
MCHC RBC-ENTMCNC: 31.5 G/DL — LOW (ref 32–36)
MCV RBC AUTO: 88.9 FL — SIGNIFICANT CHANGE UP (ref 80–100)
MONOCYTES # BLD AUTO: 1.06 K/UL — HIGH (ref 0–0.9)
MONOCYTES NFR BLD AUTO: 17 % — HIGH (ref 2–14)
NEUTROPHILS # BLD AUTO: 2.49 K/UL — SIGNIFICANT CHANGE UP (ref 1.8–7.4)
NEUTROPHILS NFR BLD AUTO: 39.8 % — LOW (ref 43–77)
NRBC # BLD: 0 /100 WBCS — SIGNIFICANT CHANGE UP (ref 0–0)
NRBC BLD-RTO: 0 /100 WBCS — SIGNIFICANT CHANGE UP (ref 0–0)
PLATELET # BLD AUTO: 331 K/UL — SIGNIFICANT CHANGE UP (ref 150–400)
POTASSIUM SERPL-MCNC: 3.3 MMOL/L — LOW (ref 3.5–5.3)
POTASSIUM SERPL-SCNC: 3.3 MMOL/L — LOW (ref 3.5–5.3)
PROT SERPL-MCNC: 6.5 G/DL — SIGNIFICANT CHANGE UP (ref 6–8.3)
RBC # BLD: 3.96 M/UL — SIGNIFICANT CHANGE UP (ref 3.8–5.2)
RBC # FLD: 17.2 % — HIGH (ref 10.3–14.5)
SODIUM SERPL-SCNC: 144 MMOL/L — SIGNIFICANT CHANGE UP (ref 135–145)
WBC # BLD: 6.25 K/UL — SIGNIFICANT CHANGE UP (ref 3.8–10.5)
WBC # FLD AUTO: 6.25 K/UL — SIGNIFICANT CHANGE UP (ref 3.8–10.5)

## 2025-01-21 PROCEDURE — G2211 COMPLEX E/M VISIT ADD ON: CPT | Mod: NC

## 2025-01-21 PROCEDURE — 99214 OFFICE O/P EST MOD 30 MIN: CPT

## 2025-01-21 RX ORDER — POTASSIUM CHLORIDE 1500 MG/1
20 TABLET, FILM COATED, EXTENDED RELEASE ORAL
Qty: 7 | Refills: 0 | Status: ACTIVE | COMMUNITY
Start: 2025-01-21 | End: 1900-01-01

## 2025-01-22 LAB — T4 FREE+ TSH PNL SERPL: 1.3 UIU/ML — SIGNIFICANT CHANGE UP (ref 0.27–4.2)

## 2025-01-27 ENCOUNTER — NON-APPOINTMENT (OUTPATIENT)
Age: 60
End: 2025-01-27

## 2025-01-27 ENCOUNTER — APPOINTMENT (OUTPATIENT)
Dept: CARDIOLOGY | Facility: CLINIC | Age: 60
End: 2025-01-27

## 2025-01-27 VITALS
WEIGHT: 187.39 LBS | TEMPERATURE: 97.2 F | BODY MASS INDEX: 28.73 KG/M2 | OXYGEN SATURATION: 95 % | HEIGHT: 67.52 IN | DIASTOLIC BLOOD PRESSURE: 65 MMHG | SYSTOLIC BLOOD PRESSURE: 99 MMHG | HEART RATE: 86 BPM

## 2025-01-27 DIAGNOSIS — G62.9 POLYNEUROPATHY, UNSPECIFIED: ICD-10-CM

## 2025-01-27 DIAGNOSIS — Z01.818 ENCOUNTER FOR OTHER PREPROCEDURAL EXAMINATION: ICD-10-CM

## 2025-01-27 DIAGNOSIS — E78.00 PURE HYPERCHOLESTEROLEMIA, UNSPECIFIED: ICD-10-CM

## 2025-01-27 DIAGNOSIS — Z91.89 OTHER SPECIFIED PERSONAL RISK FACTORS, NOT ELSEWHERE CLASSIFIED: ICD-10-CM

## 2025-01-27 PROCEDURE — 99214 OFFICE O/P EST MOD 30 MIN: CPT | Mod: 25

## 2025-01-27 PROCEDURE — 93000 ELECTROCARDIOGRAM COMPLETE: CPT

## 2025-01-27 PROCEDURE — 93010 ELECTROCARDIOGRAM REPORT: CPT

## 2025-01-30 ENCOUNTER — APPOINTMENT (OUTPATIENT)
Dept: CARDIOLOGY | Facility: CLINIC | Age: 60
End: 2025-01-30
Payer: MEDICAID

## 2025-01-30 PROCEDURE — 93356 MYOCRD STRAIN IMG SPCKL TRCK: CPT

## 2025-01-30 PROCEDURE — 93306 TTE W/DOPPLER COMPLETE: CPT

## 2025-02-07 ENCOUNTER — OUTPATIENT (OUTPATIENT)
Dept: OUTPATIENT SERVICES | Facility: HOSPITAL | Age: 60
LOS: 1 days | Discharge: HOME | End: 2025-02-07

## 2025-02-07 DIAGNOSIS — Z98.890 OTHER SPECIFIED POSTPROCEDURAL STATES: Chronic | ICD-10-CM

## 2025-02-07 DIAGNOSIS — D64.9 ANEMIA, UNSPECIFIED: ICD-10-CM

## 2025-02-07 DIAGNOSIS — Z90.710 ACQUIRED ABSENCE OF BOTH CERVIX AND UTERUS: Chronic | ICD-10-CM

## 2025-02-11 ENCOUNTER — APPOINTMENT (OUTPATIENT)
Dept: HEMATOLOGY ONCOLOGY | Facility: CLINIC | Age: 60
End: 2025-02-11
Payer: MEDICAID

## 2025-02-11 ENCOUNTER — RESULT REVIEW (OUTPATIENT)
Age: 60
End: 2025-02-11

## 2025-02-11 ENCOUNTER — APPOINTMENT (OUTPATIENT)
Dept: HEMATOLOGY ONCOLOGY | Facility: CLINIC | Age: 60
End: 2025-02-11

## 2025-02-11 ENCOUNTER — APPOINTMENT (OUTPATIENT)
Dept: INFUSION THERAPY | Facility: HOSPITAL | Age: 60
End: 2025-02-11

## 2025-02-11 VITALS
HEART RATE: 70 BPM | RESPIRATION RATE: 16 BRPM | OXYGEN SATURATION: 97 % | TEMPERATURE: 97.6 F | SYSTOLIC BLOOD PRESSURE: 109 MMHG | DIASTOLIC BLOOD PRESSURE: 71 MMHG

## 2025-02-11 DIAGNOSIS — Z17.1 MALIGNANT NEOPLASM OF UNSPECIFIED SITE OF RIGHT FEMALE BREAST: ICD-10-CM

## 2025-02-11 DIAGNOSIS — Z51.11 ENCOUNTER FOR ANTINEOPLASTIC CHEMOTHERAPY: ICD-10-CM

## 2025-02-11 DIAGNOSIS — Z51.12 ENCOUNTER FOR ANTINEOPLASTIC IMMUNOTHERAPY: ICD-10-CM

## 2025-02-11 DIAGNOSIS — D72.820 LYMPHOCYTOSIS (SYMPTOMATIC): ICD-10-CM

## 2025-02-11 DIAGNOSIS — E87.6 HYPOKALEMIA: ICD-10-CM

## 2025-02-11 DIAGNOSIS — C50.911 MALIGNANT NEOPLASM OF UNSPECIFIED SITE OF RIGHT FEMALE BREAST: ICD-10-CM

## 2025-02-11 LAB
ALBUMIN SERPL ELPH-MCNC: 4 G/DL — SIGNIFICANT CHANGE UP (ref 3.3–5)
ALP SERPL-CCNC: 86 U/L — SIGNIFICANT CHANGE UP (ref 40–120)
ALT FLD-CCNC: 27 U/L — SIGNIFICANT CHANGE UP (ref 10–45)
ANION GAP SERPL CALC-SCNC: 10 MMOL/L — SIGNIFICANT CHANGE UP (ref 5–17)
AST SERPL-CCNC: 28 U/L — SIGNIFICANT CHANGE UP (ref 10–40)
BASOPHILS # BLD AUTO: 0.08 K/UL — SIGNIFICANT CHANGE UP (ref 0–0.2)
BASOPHILS NFR BLD AUTO: 1.5 % — SIGNIFICANT CHANGE UP (ref 0–2)
BILIRUB SERPL-MCNC: 0.3 MG/DL — SIGNIFICANT CHANGE UP (ref 0.2–1.2)
BUN SERPL-MCNC: 11 MG/DL — SIGNIFICANT CHANGE UP (ref 7–23)
CALCIUM SERPL-MCNC: 9.3 MG/DL — SIGNIFICANT CHANGE UP (ref 8.4–10.5)
CHLORIDE SERPL-SCNC: 106 MMOL/L — SIGNIFICANT CHANGE UP (ref 96–108)
CO2 SERPL-SCNC: 26 MMOL/L — SIGNIFICANT CHANGE UP (ref 22–31)
CREAT SERPL-MCNC: 0.53 MG/DL — SIGNIFICANT CHANGE UP (ref 0.5–1.3)
EGFR: 105 ML/MIN/1.73M2 — SIGNIFICANT CHANGE UP
EGFR: 105 ML/MIN/1.73M2 — SIGNIFICANT CHANGE UP
EOSINOPHIL # BLD AUTO: 0.24 K/UL — SIGNIFICANT CHANGE UP (ref 0–0.5)
EOSINOPHIL NFR BLD AUTO: 4.5 % — SIGNIFICANT CHANGE UP (ref 0–6)
GLUCOSE SERPL-MCNC: 104 MG/DL — HIGH (ref 70–99)
HCT VFR BLD CALC: 35.9 % — SIGNIFICANT CHANGE UP (ref 34.5–45)
HGB BLD-MCNC: 11.4 G/DL — LOW (ref 11.5–15.5)
IMM GRANULOCYTES NFR BLD AUTO: 0.4 % — SIGNIFICANT CHANGE UP (ref 0–0.9)
LYMPHOCYTES # BLD AUTO: 1.95 K/UL — SIGNIFICANT CHANGE UP (ref 1–3.3)
LYMPHOCYTES # BLD AUTO: 36.4 % — SIGNIFICANT CHANGE UP (ref 13–44)
MCHC RBC-ENTMCNC: 27.9 PG — SIGNIFICANT CHANGE UP (ref 27–34)
MCHC RBC-ENTMCNC: 31.8 G/DL — LOW (ref 32–36)
MCV RBC AUTO: 88 FL — SIGNIFICANT CHANGE UP (ref 80–100)
MONOCYTES # BLD AUTO: 0.85 K/UL — SIGNIFICANT CHANGE UP (ref 0–0.9)
MONOCYTES NFR BLD AUTO: 15.9 % — HIGH (ref 2–14)
NEUTROPHILS # BLD AUTO: 2.21 K/UL — SIGNIFICANT CHANGE UP (ref 1.8–7.4)
NEUTROPHILS NFR BLD AUTO: 41.3 % — LOW (ref 43–77)
NRBC # BLD: 0 /100 WBCS — SIGNIFICANT CHANGE UP (ref 0–0)
NRBC BLD-RTO: 0 /100 WBCS — SIGNIFICANT CHANGE UP (ref 0–0)
PLATELET # BLD AUTO: 278 K/UL — SIGNIFICANT CHANGE UP (ref 150–400)
POTASSIUM SERPL-MCNC: 3.7 MMOL/L — SIGNIFICANT CHANGE UP (ref 3.5–5.3)
POTASSIUM SERPL-SCNC: 3.7 MMOL/L — SIGNIFICANT CHANGE UP (ref 3.5–5.3)
PROT SERPL-MCNC: 6.7 G/DL — SIGNIFICANT CHANGE UP (ref 6–8.3)
RBC # BLD: 4.08 M/UL — SIGNIFICANT CHANGE UP (ref 3.8–5.2)
RBC # FLD: 16 % — HIGH (ref 10.3–14.5)
SODIUM SERPL-SCNC: 142 MMOL/L — SIGNIFICANT CHANGE UP (ref 135–145)
WBC # BLD: 5.35 K/UL — SIGNIFICANT CHANGE UP (ref 3.8–10.5)
WBC # FLD AUTO: 5.35 K/UL — SIGNIFICANT CHANGE UP (ref 3.8–10.5)

## 2025-02-11 PROCEDURE — 99214 OFFICE O/P EST MOD 30 MIN: CPT

## 2025-02-11 PROCEDURE — G2211 COMPLEX E/M VISIT ADD ON: CPT | Mod: NC

## 2025-02-12 DIAGNOSIS — E86.0 DEHYDRATION: ICD-10-CM

## 2025-02-12 DIAGNOSIS — Z51.11 ENCOUNTER FOR ANTINEOPLASTIC CHEMOTHERAPY: ICD-10-CM

## 2025-02-12 DIAGNOSIS — Z51.89 ENCOUNTER FOR OTHER SPECIFIED AFTERCARE: ICD-10-CM

## 2025-02-12 DIAGNOSIS — R11.2 NAUSEA WITH VOMITING, UNSPECIFIED: ICD-10-CM

## 2025-02-12 LAB — T4 FREE+ TSH PNL SERPL: 1.94 UIU/ML — SIGNIFICANT CHANGE UP (ref 0.27–4.2)

## 2025-02-17 ENCOUNTER — RX RENEWAL (OUTPATIENT)
Age: 60
End: 2025-02-17

## 2025-02-24 ENCOUNTER — NON-APPOINTMENT (OUTPATIENT)
Age: 60
End: 2025-02-24

## 2025-02-25 LAB
ALBUMIN SERPL ELPH-MCNC: 4.3 G/DL
ALP BLD-CCNC: 125 U/L
ALT SERPL-CCNC: 23 U/L
ANION GAP SERPL CALC-SCNC: 12 MMOL/L
AST SERPL-CCNC: 20 U/L
BASOPHILS # BLD AUTO: 0.07 K/UL
BASOPHILS NFR BLD AUTO: 0.8 %
BILIRUB SERPL-MCNC: 0.2 MG/DL
BUN SERPL-MCNC: 9 MG/DL
CALCIUM SERPL-MCNC: 9.3 MG/DL
CHLORIDE SERPL-SCNC: 106 MMOL/L
CO2 SERPL-SCNC: 27 MMOL/L
CREAT SERPL-MCNC: 0.69 MG/DL
EGFR: 99 ML/MIN/1.73M2
EOSINOPHIL # BLD AUTO: 0.15 K/UL
EOSINOPHIL NFR BLD AUTO: 1.8 %
GLUCOSE SERPL-MCNC: 102 MG/DL
HCT VFR BLD CALC: 35.9 %
HGB BLD-MCNC: 11.4 G/DL
IMM GRANULOCYTES NFR BLD AUTO: 1.3 %
INR PPP: 0.99 RATIO
LYMPHOCYTES # BLD AUTO: 2.14 K/UL
LYMPHOCYTES NFR BLD AUTO: 25.1 %
MAN DIFF?: NORMAL
MCHC RBC-ENTMCNC: 27.5 PG
MCHC RBC-ENTMCNC: 31.8 G/DL
MCV RBC AUTO: 86.7 FL
MONOCYTES # BLD AUTO: 1.11 K/UL
MONOCYTES NFR BLD AUTO: 13 %
NEUTROPHILS # BLD AUTO: 4.94 K/UL
NEUTROPHILS NFR BLD AUTO: 58 %
PLATELET # BLD AUTO: 140 K/UL
POTASSIUM SERPL-SCNC: 3.9 MMOL/L
PROT SERPL-MCNC: 6.9 G/DL
PT BLD: 11.7 SEC
RBC # BLD: 4.14 M/UL
RBC # FLD: 16.4 %
SODIUM SERPL-SCNC: 145 MMOL/L
WBC # FLD AUTO: 8.52 K/UL

## 2025-03-03 ENCOUNTER — APPOINTMENT (OUTPATIENT)
Age: 60
End: 2025-03-03
Payer: MEDICAID

## 2025-03-03 PROCEDURE — D7140: CPT

## 2025-03-10 ENCOUNTER — APPOINTMENT (OUTPATIENT)
Dept: HEMATOLOGY ONCOLOGY | Facility: CLINIC | Age: 60
End: 2025-03-10
Payer: MEDICAID

## 2025-03-10 VITALS
DIASTOLIC BLOOD PRESSURE: 78 MMHG | WEIGHT: 188.47 LBS | HEART RATE: 78 BPM | RESPIRATION RATE: 16 BRPM | BODY MASS INDEX: 29.07 KG/M2 | OXYGEN SATURATION: 99 % | SYSTOLIC BLOOD PRESSURE: 122 MMHG | TEMPERATURE: 97.4 F

## 2025-03-10 DIAGNOSIS — Z17.1 MALIGNANT NEOPLASM OF UNSPECIFIED SITE OF RIGHT FEMALE BREAST: ICD-10-CM

## 2025-03-10 DIAGNOSIS — C50.911 MALIGNANT NEOPLASM OF UNSPECIFIED SITE OF RIGHT FEMALE BREAST: ICD-10-CM

## 2025-03-10 DIAGNOSIS — D72.820 LYMPHOCYTOSIS (SYMPTOMATIC): ICD-10-CM

## 2025-03-10 PROCEDURE — 99214 OFFICE O/P EST MOD 30 MIN: CPT

## 2025-03-10 PROCEDURE — G2211 COMPLEX E/M VISIT ADD ON: CPT | Mod: NC

## 2025-04-07 ENCOUNTER — OUTPATIENT (OUTPATIENT)
Dept: OUTPATIENT SERVICES | Facility: HOSPITAL | Age: 60
LOS: 1 days | Discharge: HOME | End: 2025-04-07
Payer: MEDICAID

## 2025-04-07 DIAGNOSIS — Z90.710 ACQUIRED ABSENCE OF BOTH CERVIX AND UTERUS: Chronic | ICD-10-CM

## 2025-04-07 DIAGNOSIS — D64.9 ANEMIA, UNSPECIFIED: ICD-10-CM

## 2025-04-07 DIAGNOSIS — Z98.890 OTHER SPECIFIED POSTPROCEDURAL STATES: Chronic | ICD-10-CM

## 2025-04-10 ENCOUNTER — APPOINTMENT (OUTPATIENT)
Dept: HEMATOLOGY ONCOLOGY | Facility: CLINIC | Age: 60
End: 2025-04-10
Payer: MEDICAID

## 2025-04-10 VITALS
HEART RATE: 88 BPM | WEIGHT: 187.39 LBS | SYSTOLIC BLOOD PRESSURE: 116 MMHG | RESPIRATION RATE: 16 BRPM | BODY MASS INDEX: 28.9 KG/M2 | TEMPERATURE: 97.3 F | DIASTOLIC BLOOD PRESSURE: 74 MMHG | OXYGEN SATURATION: 98 %

## 2025-04-10 DIAGNOSIS — Z17.1 MALIGNANT NEOPLASM OF UNSPECIFIED SITE OF RIGHT FEMALE BREAST: ICD-10-CM

## 2025-04-10 DIAGNOSIS — C50.911 MALIGNANT NEOPLASM OF UNSPECIFIED SITE OF RIGHT FEMALE BREAST: ICD-10-CM

## 2025-04-10 DIAGNOSIS — D72.820 LYMPHOCYTOSIS (SYMPTOMATIC): ICD-10-CM

## 2025-04-10 PROCEDURE — G2211 COMPLEX E/M VISIT ADD ON: CPT | Mod: NC

## 2025-04-10 PROCEDURE — 99214 OFFICE O/P EST MOD 30 MIN: CPT

## 2025-04-15 ENCOUNTER — RX RENEWAL (OUTPATIENT)
Age: 60
End: 2025-04-15

## 2025-04-18 ENCOUNTER — APPOINTMENT (OUTPATIENT)
Dept: RADIATION ONCOLOGY | Facility: CLINIC | Age: 60
End: 2025-04-18

## 2025-04-18 VITALS
HEART RATE: 88 BPM | HEIGHT: 67.25 IN | RESPIRATION RATE: 17 BRPM | DIASTOLIC BLOOD PRESSURE: 80 MMHG | TEMPERATURE: 96.98 F | SYSTOLIC BLOOD PRESSURE: 126 MMHG | OXYGEN SATURATION: 97 %

## 2025-04-18 PROCEDURE — 99204 OFFICE O/P NEW MOD 45 MIN: CPT

## 2025-04-22 ENCOUNTER — RESULT REVIEW (OUTPATIENT)
Age: 60
End: 2025-04-22

## 2025-04-22 ENCOUNTER — APPOINTMENT (OUTPATIENT)
Dept: INFUSION THERAPY | Facility: HOSPITAL | Age: 60
End: 2025-04-22

## 2025-04-22 LAB
ALBUMIN SERPL ELPH-MCNC: 3.9 G/DL — SIGNIFICANT CHANGE UP (ref 3.3–5)
ALP SERPL-CCNC: 73 U/L — SIGNIFICANT CHANGE UP (ref 40–120)
ALT FLD-CCNC: 24 U/L — SIGNIFICANT CHANGE UP (ref 10–45)
ANION GAP SERPL CALC-SCNC: 12 MMOL/L — SIGNIFICANT CHANGE UP (ref 5–17)
AST SERPL-CCNC: 36 U/L — SIGNIFICANT CHANGE UP (ref 10–40)
BASOPHILS # BLD AUTO: 0.03 K/UL — SIGNIFICANT CHANGE UP (ref 0–0.2)
BASOPHILS NFR BLD AUTO: 0.6 % — SIGNIFICANT CHANGE UP (ref 0–2)
BILIRUB SERPL-MCNC: 0.8 MG/DL — SIGNIFICANT CHANGE UP (ref 0.2–1.2)
BUN SERPL-MCNC: 12 MG/DL — SIGNIFICANT CHANGE UP (ref 7–23)
CALCIUM SERPL-MCNC: 9.1 MG/DL — SIGNIFICANT CHANGE UP (ref 8.4–10.5)
CHLORIDE SERPL-SCNC: 108 MMOL/L — SIGNIFICANT CHANGE UP (ref 96–108)
CO2 SERPL-SCNC: 22 MMOL/L — SIGNIFICANT CHANGE UP (ref 22–31)
CREAT SERPL-MCNC: 0.58 MG/DL — SIGNIFICANT CHANGE UP (ref 0.5–1.3)
EGFR: 103 ML/MIN/1.73M2 — SIGNIFICANT CHANGE UP
EGFR: 103 ML/MIN/1.73M2 — SIGNIFICANT CHANGE UP
EOSINOPHIL # BLD AUTO: 0.36 K/UL — SIGNIFICANT CHANGE UP (ref 0–0.5)
EOSINOPHIL NFR BLD AUTO: 6.9 % — HIGH (ref 0–6)
GLUCOSE SERPL-MCNC: 113 MG/DL — HIGH (ref 70–99)
HCT VFR BLD CALC: 36.6 % — SIGNIFICANT CHANGE UP (ref 34.5–45)
HGB BLD-MCNC: 11 G/DL — LOW (ref 11.5–15.5)
IMM GRANULOCYTES NFR BLD AUTO: 0 % — SIGNIFICANT CHANGE UP (ref 0–0.9)
LYMPHOCYTES # BLD AUTO: 2.22 K/UL — SIGNIFICANT CHANGE UP (ref 1–3.3)
LYMPHOCYTES # BLD AUTO: 42.4 % — SIGNIFICANT CHANGE UP (ref 13–44)
MCHC RBC-ENTMCNC: 25.4 PG — LOW (ref 27–34)
MCHC RBC-ENTMCNC: 30.1 G/DL — LOW (ref 32–36)
MCV RBC AUTO: 84.5 FL — SIGNIFICANT CHANGE UP (ref 80–100)
MONOCYTES # BLD AUTO: 0.44 K/UL — SIGNIFICANT CHANGE UP (ref 0–0.9)
MONOCYTES NFR BLD AUTO: 8.4 % — SIGNIFICANT CHANGE UP (ref 2–14)
NEUTROPHILS # BLD AUTO: 2.18 K/UL — SIGNIFICANT CHANGE UP (ref 1.8–7.4)
NEUTROPHILS NFR BLD AUTO: 41.7 % — LOW (ref 43–77)
NRBC BLD AUTO-RTO: 0 /100 WBCS — SIGNIFICANT CHANGE UP (ref 0–0)
PLATELET # BLD AUTO: 176 K/UL — SIGNIFICANT CHANGE UP (ref 150–400)
POTASSIUM SERPL-MCNC: 3.7 MMOL/L — SIGNIFICANT CHANGE UP (ref 3.5–5.3)
POTASSIUM SERPL-SCNC: 3.7 MMOL/L — SIGNIFICANT CHANGE UP (ref 3.5–5.3)
PROT SERPL-MCNC: 6.4 G/DL — SIGNIFICANT CHANGE UP (ref 6–8.3)
RBC # BLD: 4.33 M/UL — SIGNIFICANT CHANGE UP (ref 3.8–5.2)
RBC # FLD: 15.3 % — HIGH (ref 10.3–14.5)
SODIUM SERPL-SCNC: 142 MMOL/L — SIGNIFICANT CHANGE UP (ref 135–145)
T4 FREE+ TSH PNL SERPL: 1.25 UIU/ML — SIGNIFICANT CHANGE UP (ref 0.27–4.2)
WBC # BLD: 5.23 K/UL — SIGNIFICANT CHANGE UP (ref 3.8–10.5)
WBC # FLD AUTO: 5.23 K/UL — SIGNIFICANT CHANGE UP (ref 3.8–10.5)

## 2025-04-22 PROCEDURE — 88321 CONSLTJ&REPRT SLD PREP ELSWR: CPT

## 2025-04-23 DIAGNOSIS — Z51.11 ENCOUNTER FOR ANTINEOPLASTIC CHEMOTHERAPY: ICD-10-CM

## 2025-04-23 DIAGNOSIS — C50.919 MALIGNANT NEOPLASM OF UNSPECIFIED SITE OF UNSPECIFIED FEMALE BREAST: ICD-10-CM

## 2025-04-23 LAB — SURGICAL PATHOLOGY STUDY: SIGNIFICANT CHANGE UP

## 2025-04-28 ENCOUNTER — APPOINTMENT (OUTPATIENT)
Dept: CARDIOLOGY | Facility: CLINIC | Age: 60
End: 2025-04-28

## 2025-04-28 VITALS
HEIGHT: 67.25 IN | SYSTOLIC BLOOD PRESSURE: 99 MMHG | OXYGEN SATURATION: 97 % | WEIGHT: 182.98 LBS | DIASTOLIC BLOOD PRESSURE: 62 MMHG | TEMPERATURE: 97.4 F | BODY MASS INDEX: 28.38 KG/M2 | HEART RATE: 80 BPM

## 2025-04-28 DIAGNOSIS — Z01.818 ENCOUNTER FOR OTHER PREPROCEDURAL EXAMINATION: ICD-10-CM

## 2025-04-28 DIAGNOSIS — E78.00 PURE HYPERCHOLESTEROLEMIA, UNSPECIFIED: ICD-10-CM

## 2025-04-28 DIAGNOSIS — Z91.89 OTHER SPECIFIED PERSONAL RISK FACTORS, NOT ELSEWHERE CLASSIFIED: ICD-10-CM

## 2025-04-28 DIAGNOSIS — Z87.898 PERSONAL HISTORY OF OTHER SPECIFIED CONDITIONS: ICD-10-CM

## 2025-04-28 DIAGNOSIS — N60.92 UNSPECIFIED BENIGN MAMMARY DYSPLASIA OF LEFT BREAST: ICD-10-CM

## 2025-04-28 DIAGNOSIS — Z86.19 PERSONAL HISTORY OF OTHER INFECTIOUS AND PARASITIC DISEASES: ICD-10-CM

## 2025-04-28 LAB — TROPONIN-T, HIGH SENSITIVITY: 16 NG/L

## 2025-04-28 PROCEDURE — 99214 OFFICE O/P EST MOD 30 MIN: CPT | Mod: 25

## 2025-04-28 PROCEDURE — 93000 ELECTROCARDIOGRAM COMPLETE: CPT

## 2025-04-28 PROCEDURE — 93010 ELECTROCARDIOGRAM REPORT: CPT

## 2025-04-29 LAB
CHOLEST SERPL-MCNC: 152 MG/DL
HDLC SERPL-MCNC: 42 MG/DL
LDLC SERPL-MCNC: 87 MG/DL
NONHDLC SERPL-MCNC: 111 MG/DL
TRIGL SERPL-MCNC: 132 MG/DL

## 2025-04-30 PROBLEM — Z92.3 H/O THERAPEUTIC RADIATION: Status: ACTIVE | Noted: 2025-04-30

## 2025-04-30 LAB
NT-PROBNP SERPL-MCNC: <36 PG/ML
TROPONIN I SERPL HS-MCNC: 12 NG/L

## 2025-05-13 ENCOUNTER — APPOINTMENT (OUTPATIENT)
Dept: INFUSION THERAPY | Facility: HOSPITAL | Age: 60
End: 2025-05-13

## 2025-05-13 ENCOUNTER — RESULT REVIEW (OUTPATIENT)
Age: 60
End: 2025-05-13

## 2025-05-13 ENCOUNTER — APPOINTMENT (OUTPATIENT)
Dept: HEMATOLOGY ONCOLOGY | Facility: CLINIC | Age: 60
End: 2025-05-13
Payer: MEDICAID

## 2025-05-13 VITALS
HEART RATE: 76 BPM | OXYGEN SATURATION: 97 % | TEMPERATURE: 97.3 F | RESPIRATION RATE: 17 BRPM | DIASTOLIC BLOOD PRESSURE: 63 MMHG | SYSTOLIC BLOOD PRESSURE: 96 MMHG

## 2025-05-13 DIAGNOSIS — D72.820 LYMPHOCYTOSIS (SYMPTOMATIC): ICD-10-CM

## 2025-05-13 LAB
ALBUMIN SERPL ELPH-MCNC: 4.2 G/DL — SIGNIFICANT CHANGE UP (ref 3.3–5)
ALP SERPL-CCNC: 86 U/L — SIGNIFICANT CHANGE UP (ref 40–120)
ALT FLD-CCNC: 28 U/L — SIGNIFICANT CHANGE UP (ref 10–45)
ANION GAP SERPL CALC-SCNC: 10 MMOL/L — SIGNIFICANT CHANGE UP (ref 5–17)
AST SERPL-CCNC: 30 U/L — SIGNIFICANT CHANGE UP (ref 10–40)
BASOPHILS # BLD AUTO: 0.04 K/UL — SIGNIFICANT CHANGE UP (ref 0–0.2)
BASOPHILS NFR BLD AUTO: 0.7 % — SIGNIFICANT CHANGE UP (ref 0–2)
BILIRUB SERPL-MCNC: 0.7 MG/DL — SIGNIFICANT CHANGE UP (ref 0.2–1.2)
BUN SERPL-MCNC: 10 MG/DL — SIGNIFICANT CHANGE UP (ref 7–23)
CALCIUM SERPL-MCNC: 9.2 MG/DL — SIGNIFICANT CHANGE UP (ref 8.4–10.5)
CHLORIDE SERPL-SCNC: 104 MMOL/L — SIGNIFICANT CHANGE UP (ref 96–108)
CO2 SERPL-SCNC: 26 MMOL/L — SIGNIFICANT CHANGE UP (ref 22–31)
CREAT SERPL-MCNC: 0.58 MG/DL — SIGNIFICANT CHANGE UP (ref 0.5–1.3)
EGFR: 103 ML/MIN/1.73M2 — SIGNIFICANT CHANGE UP
EGFR: 103 ML/MIN/1.73M2 — SIGNIFICANT CHANGE UP
EOSINOPHIL # BLD AUTO: 0.33 K/UL — SIGNIFICANT CHANGE UP (ref 0–0.5)
EOSINOPHIL NFR BLD AUTO: 5.9 % — SIGNIFICANT CHANGE UP (ref 0–6)
GLUCOSE SERPL-MCNC: 99 MG/DL — SIGNIFICANT CHANGE UP (ref 70–99)
HCT VFR BLD CALC: 37.8 % — SIGNIFICANT CHANGE UP (ref 34.5–45)
HGB BLD-MCNC: 11.6 G/DL — SIGNIFICANT CHANGE UP (ref 11.5–15.5)
IMM GRANULOCYTES NFR BLD AUTO: 0 % — SIGNIFICANT CHANGE UP (ref 0–0.9)
LYMPHOCYTES # BLD AUTO: 2.33 K/UL — SIGNIFICANT CHANGE UP (ref 1–3.3)
LYMPHOCYTES # BLD AUTO: 42 % — SIGNIFICANT CHANGE UP (ref 13–44)
MCHC RBC-ENTMCNC: 25.1 PG — LOW (ref 27–34)
MCHC RBC-ENTMCNC: 30.7 G/DL — LOW (ref 32–36)
MCV RBC AUTO: 81.8 FL — SIGNIFICANT CHANGE UP (ref 80–100)
MONOCYTES # BLD AUTO: 0.52 K/UL — SIGNIFICANT CHANGE UP (ref 0–0.9)
MONOCYTES NFR BLD AUTO: 9.4 % — SIGNIFICANT CHANGE UP (ref 2–14)
NEUTROPHILS # BLD AUTO: 2.33 K/UL — SIGNIFICANT CHANGE UP (ref 1.8–7.4)
NEUTROPHILS NFR BLD AUTO: 42 % — LOW (ref 43–77)
NRBC BLD AUTO-RTO: 0 /100 WBCS — SIGNIFICANT CHANGE UP (ref 0–0)
PLATELET # BLD AUTO: 187 K/UL — SIGNIFICANT CHANGE UP (ref 150–400)
POTASSIUM SERPL-MCNC: 3.9 MMOL/L — SIGNIFICANT CHANGE UP (ref 3.5–5.3)
POTASSIUM SERPL-SCNC: 3.9 MMOL/L — SIGNIFICANT CHANGE UP (ref 3.5–5.3)
PROT SERPL-MCNC: 6.8 G/DL — SIGNIFICANT CHANGE UP (ref 6–8.3)
RBC # BLD: 4.62 M/UL — SIGNIFICANT CHANGE UP (ref 3.8–5.2)
RBC # FLD: 14.7 % — HIGH (ref 10.3–14.5)
SODIUM SERPL-SCNC: 140 MMOL/L — SIGNIFICANT CHANGE UP (ref 135–145)
WBC # BLD: 5.55 K/UL — SIGNIFICANT CHANGE UP (ref 3.8–10.5)
WBC # FLD AUTO: 5.55 K/UL — SIGNIFICANT CHANGE UP (ref 3.8–10.5)

## 2025-05-13 PROCEDURE — 99214 OFFICE O/P EST MOD 30 MIN: CPT

## 2025-05-13 PROCEDURE — G2211 COMPLEX E/M VISIT ADD ON: CPT | Mod: NC

## 2025-05-14 LAB — T4 FREE+ TSH PNL SERPL: 1.64 UIU/ML — SIGNIFICANT CHANGE UP (ref 0.27–4.2)

## 2025-05-21 PROBLEM — N60.92 ATYPICAL DUCTAL HYPERPLASIA OF LEFT BREAST: Status: RESOLVED | Noted: 2021-03-11 | Resolved: 2025-05-21

## 2025-06-03 ENCOUNTER — RESULT REVIEW (OUTPATIENT)
Age: 60
End: 2025-06-03

## 2025-06-03 ENCOUNTER — NON-APPOINTMENT (OUTPATIENT)
Age: 60
End: 2025-06-03

## 2025-06-03 ENCOUNTER — APPOINTMENT (OUTPATIENT)
Dept: HEMATOLOGY ONCOLOGY | Facility: CLINIC | Age: 60
End: 2025-06-03
Payer: MEDICAID

## 2025-06-03 ENCOUNTER — APPOINTMENT (OUTPATIENT)
Dept: INFUSION THERAPY | Facility: HOSPITAL | Age: 60
End: 2025-06-03

## 2025-06-03 VITALS
DIASTOLIC BLOOD PRESSURE: 73 MMHG | RESPIRATION RATE: 16 BRPM | TEMPERATURE: 98.9 F | OXYGEN SATURATION: 99 % | HEART RATE: 79 BPM | SYSTOLIC BLOOD PRESSURE: 119 MMHG

## 2025-06-03 DIAGNOSIS — C50.911 MALIGNANT NEOPLASM OF UNSPECIFIED SITE OF RIGHT FEMALE BREAST: ICD-10-CM

## 2025-06-03 DIAGNOSIS — Z17.1 MALIGNANT NEOPLASM OF UNSPECIFIED SITE OF RIGHT FEMALE BREAST: ICD-10-CM

## 2025-06-03 DIAGNOSIS — N60.92 UNSPECIFIED BENIGN MAMMARY DYSPLASIA OF LEFT BREAST: ICD-10-CM

## 2025-06-03 DIAGNOSIS — Z51.12 ENCOUNTER FOR ANTINEOPLASTIC IMMUNOTHERAPY: ICD-10-CM

## 2025-06-03 DIAGNOSIS — Z85.3 PERSONAL HISTORY OF MALIGNANT NEOPLASM OF BREAST: ICD-10-CM

## 2025-06-03 DIAGNOSIS — D72.820 LYMPHOCYTOSIS (SYMPTOMATIC): ICD-10-CM

## 2025-06-03 LAB
ALBUMIN SERPL ELPH-MCNC: 4 G/DL — SIGNIFICANT CHANGE UP (ref 3.3–5)
ALP SERPL-CCNC: 85 U/L — SIGNIFICANT CHANGE UP (ref 40–120)
ALT FLD-CCNC: 32 U/L — SIGNIFICANT CHANGE UP (ref 10–45)
ANION GAP SERPL CALC-SCNC: 13 MMOL/L — SIGNIFICANT CHANGE UP (ref 5–17)
AST SERPL-CCNC: 31 U/L — SIGNIFICANT CHANGE UP (ref 10–40)
BASOPHILS # BLD AUTO: 0.05 K/UL — SIGNIFICANT CHANGE UP (ref 0–0.2)
BASOPHILS NFR BLD AUTO: 0.8 % — SIGNIFICANT CHANGE UP (ref 0–2)
BILIRUB SERPL-MCNC: 0.6 MG/DL — SIGNIFICANT CHANGE UP (ref 0.2–1.2)
BUN SERPL-MCNC: 8 MG/DL — SIGNIFICANT CHANGE UP (ref 7–23)
CALCIUM SERPL-MCNC: 9.3 MG/DL — SIGNIFICANT CHANGE UP (ref 8.4–10.5)
CHLORIDE SERPL-SCNC: 105 MMOL/L — SIGNIFICANT CHANGE UP (ref 96–108)
CO2 SERPL-SCNC: 24 MMOL/L — SIGNIFICANT CHANGE UP (ref 22–31)
CREAT SERPL-MCNC: 0.58 MG/DL — SIGNIFICANT CHANGE UP (ref 0.5–1.3)
EGFR: 103 ML/MIN/1.73M2 — SIGNIFICANT CHANGE UP
EGFR: 103 ML/MIN/1.73M2 — SIGNIFICANT CHANGE UP
EOSINOPHIL # BLD AUTO: 0.38 K/UL — SIGNIFICANT CHANGE UP (ref 0–0.5)
EOSINOPHIL NFR BLD AUTO: 5.9 % — SIGNIFICANT CHANGE UP (ref 0–6)
GLUCOSE SERPL-MCNC: 98 MG/DL — SIGNIFICANT CHANGE UP (ref 70–99)
HCT VFR BLD CALC: 39.5 % — SIGNIFICANT CHANGE UP (ref 34.5–45)
HGB BLD-MCNC: 12.1 G/DL — SIGNIFICANT CHANGE UP (ref 11.5–15.5)
IMM GRANULOCYTES NFR BLD AUTO: 0.3 % — SIGNIFICANT CHANGE UP (ref 0–0.9)
LYMPHOCYTES # BLD AUTO: 2.68 K/UL — SIGNIFICANT CHANGE UP (ref 1–3.3)
LYMPHOCYTES # BLD AUTO: 41.9 % — SIGNIFICANT CHANGE UP (ref 13–44)
MCHC RBC-ENTMCNC: 25.1 PG — LOW (ref 27–34)
MCHC RBC-ENTMCNC: 30.6 G/DL — LOW (ref 32–36)
MCV RBC AUTO: 81.8 FL — SIGNIFICANT CHANGE UP (ref 80–100)
MONOCYTES # BLD AUTO: 0.53 K/UL — SIGNIFICANT CHANGE UP (ref 0–0.9)
MONOCYTES NFR BLD AUTO: 8.3 % — SIGNIFICANT CHANGE UP (ref 2–14)
NEUTROPHILS # BLD AUTO: 2.74 K/UL — SIGNIFICANT CHANGE UP (ref 1.8–7.4)
NEUTROPHILS NFR BLD AUTO: 42.8 % — LOW (ref 43–77)
NRBC BLD AUTO-RTO: 0 /100 WBCS — SIGNIFICANT CHANGE UP (ref 0–0)
PLATELET # BLD AUTO: 219 K/UL — SIGNIFICANT CHANGE UP (ref 150–400)
POTASSIUM SERPL-MCNC: 3.9 MMOL/L — SIGNIFICANT CHANGE UP (ref 3.5–5.3)
POTASSIUM SERPL-SCNC: 3.9 MMOL/L — SIGNIFICANT CHANGE UP (ref 3.5–5.3)
PROT SERPL-MCNC: 6.9 G/DL — SIGNIFICANT CHANGE UP (ref 6–8.3)
RBC # BLD: 4.83 M/UL — SIGNIFICANT CHANGE UP (ref 3.8–5.2)
RBC # FLD: 15 % — HIGH (ref 10.3–14.5)
SODIUM SERPL-SCNC: 142 MMOL/L — SIGNIFICANT CHANGE UP (ref 135–145)
T4 FREE+ TSH PNL SERPL: 1.66 UIU/ML — SIGNIFICANT CHANGE UP (ref 0.27–4.2)
WBC # BLD: 6.4 K/UL — SIGNIFICANT CHANGE UP (ref 3.8–10.5)
WBC # FLD AUTO: 6.4 K/UL — SIGNIFICANT CHANGE UP (ref 3.8–10.5)

## 2025-06-03 PROCEDURE — 99214 OFFICE O/P EST MOD 30 MIN: CPT

## 2025-06-03 PROCEDURE — G2211 COMPLEX E/M VISIT ADD ON: CPT | Mod: NC

## 2025-06-11 ENCOUNTER — APPOINTMENT (OUTPATIENT)
Dept: ULTRASOUND IMAGING | Facility: IMAGING CENTER | Age: 60
End: 2025-06-11

## 2025-06-11 ENCOUNTER — OUTPATIENT (OUTPATIENT)
Dept: OUTPATIENT SERVICES | Facility: HOSPITAL | Age: 60
LOS: 1 days | End: 2025-06-11
Payer: MEDICAID

## 2025-06-11 ENCOUNTER — NON-APPOINTMENT (OUTPATIENT)
Age: 60
End: 2025-06-11

## 2025-06-11 ENCOUNTER — RESULT REVIEW (OUTPATIENT)
Age: 60
End: 2025-06-11

## 2025-06-11 DIAGNOSIS — C50.911 MALIGNANT NEOPLASM OF UNSPECIFIED SITE OF RIGHT FEMALE BREAST: ICD-10-CM

## 2025-06-11 DIAGNOSIS — Z98.890 OTHER SPECIFIED POSTPROCEDURAL STATES: Chronic | ICD-10-CM

## 2025-06-11 DIAGNOSIS — Z90.710 ACQUIRED ABSENCE OF BOTH CERVIX AND UTERUS: Chronic | ICD-10-CM

## 2025-06-11 PROCEDURE — 76642 ULTRASOUND BREAST LIMITED: CPT

## 2025-06-11 PROCEDURE — 76642 ULTRASOUND BREAST LIMITED: CPT | Mod: 26,RT

## 2025-06-12 ENCOUNTER — NON-APPOINTMENT (OUTPATIENT)
Age: 60
End: 2025-06-12

## 2025-06-12 ENCOUNTER — OUTPATIENT (OUTPATIENT)
Dept: OUTPATIENT SERVICES | Facility: HOSPITAL | Age: 60
LOS: 1 days | Discharge: HOME | End: 2025-06-12

## 2025-06-12 DIAGNOSIS — Z90.710 ACQUIRED ABSENCE OF BOTH CERVIX AND UTERUS: Chronic | ICD-10-CM

## 2025-06-12 DIAGNOSIS — Z98.890 OTHER SPECIFIED POSTPROCEDURAL STATES: Chronic | ICD-10-CM

## 2025-06-12 DIAGNOSIS — D64.9 ANEMIA, UNSPECIFIED: ICD-10-CM

## 2025-06-24 ENCOUNTER — RESULT REVIEW (OUTPATIENT)
Age: 60
End: 2025-06-24

## 2025-06-24 ENCOUNTER — APPOINTMENT (OUTPATIENT)
Dept: HEMATOLOGY ONCOLOGY | Facility: CLINIC | Age: 60
End: 2025-06-24
Payer: MEDICAID

## 2025-06-24 ENCOUNTER — APPOINTMENT (OUTPATIENT)
Dept: INFUSION THERAPY | Facility: HOSPITAL | Age: 60
End: 2025-06-24

## 2025-06-24 VITALS
SYSTOLIC BLOOD PRESSURE: 125 MMHG | RESPIRATION RATE: 16 BRPM | DIASTOLIC BLOOD PRESSURE: 76 MMHG | HEART RATE: 78 BPM | OXYGEN SATURATION: 98 %

## 2025-06-24 LAB
ALBUMIN SERPL ELPH-MCNC: 4.1 G/DL — SIGNIFICANT CHANGE UP (ref 3.3–5)
ALP SERPL-CCNC: 84 U/L — SIGNIFICANT CHANGE UP (ref 40–120)
ALT FLD-CCNC: 31 U/L — SIGNIFICANT CHANGE UP (ref 10–45)
ANION GAP SERPL CALC-SCNC: 12 MMOL/L — SIGNIFICANT CHANGE UP (ref 5–17)
AST SERPL-CCNC: 31 U/L — SIGNIFICANT CHANGE UP (ref 10–40)
BASOPHILS # BLD AUTO: 0.04 K/UL — SIGNIFICANT CHANGE UP (ref 0–0.2)
BASOPHILS NFR BLD AUTO: 0.6 % — SIGNIFICANT CHANGE UP (ref 0–2)
BILIRUB SERPL-MCNC: 0.8 MG/DL — SIGNIFICANT CHANGE UP (ref 0.2–1.2)
BUN SERPL-MCNC: 11 MG/DL — SIGNIFICANT CHANGE UP (ref 7–23)
CALCIUM SERPL-MCNC: 9.3 MG/DL — SIGNIFICANT CHANGE UP (ref 8.4–10.5)
CHLORIDE SERPL-SCNC: 103 MMOL/L — SIGNIFICANT CHANGE UP (ref 96–108)
CO2 SERPL-SCNC: 26 MMOL/L — SIGNIFICANT CHANGE UP (ref 22–31)
CREAT SERPL-MCNC: 0.62 MG/DL — SIGNIFICANT CHANGE UP (ref 0.5–1.3)
EGFR: 101 ML/MIN/1.73M2 — SIGNIFICANT CHANGE UP
EGFR: 101 ML/MIN/1.73M2 — SIGNIFICANT CHANGE UP
EOSINOPHIL # BLD AUTO: 0.43 K/UL — SIGNIFICANT CHANGE UP (ref 0–0.5)
EOSINOPHIL NFR BLD AUTO: 6.5 % — HIGH (ref 0–6)
GLUCOSE SERPL-MCNC: 90 MG/DL — SIGNIFICANT CHANGE UP (ref 70–99)
HCT VFR BLD CALC: 38 % — SIGNIFICANT CHANGE UP (ref 34.5–45)
HGB BLD-MCNC: 11.8 G/DL — SIGNIFICANT CHANGE UP (ref 11.5–15.5)
IMM GRANULOCYTES NFR BLD AUTO: 0.2 % — SIGNIFICANT CHANGE UP (ref 0–0.9)
LYMPHOCYTES # BLD AUTO: 2.72 K/UL — SIGNIFICANT CHANGE UP (ref 1–3.3)
LYMPHOCYTES # BLD AUTO: 41.3 % — SIGNIFICANT CHANGE UP (ref 13–44)
MCHC RBC-ENTMCNC: 24.6 PG — LOW (ref 27–34)
MCHC RBC-ENTMCNC: 31.1 G/DL — LOW (ref 32–36)
MCV RBC AUTO: 79.3 FL — LOW (ref 80–100)
MONOCYTES # BLD AUTO: 0.58 K/UL — SIGNIFICANT CHANGE UP (ref 0–0.9)
MONOCYTES NFR BLD AUTO: 8.8 % — SIGNIFICANT CHANGE UP (ref 2–14)
NEUTROPHILS # BLD AUTO: 2.81 K/UL — SIGNIFICANT CHANGE UP (ref 1.8–7.4)
NEUTROPHILS NFR BLD AUTO: 42.6 % — LOW (ref 43–77)
NRBC BLD AUTO-RTO: 0 /100 WBCS — SIGNIFICANT CHANGE UP (ref 0–0)
PLATELET # BLD AUTO: 187 K/UL — SIGNIFICANT CHANGE UP (ref 150–400)
POTASSIUM SERPL-MCNC: 3.8 MMOL/L — SIGNIFICANT CHANGE UP (ref 3.5–5.3)
POTASSIUM SERPL-SCNC: 3.8 MMOL/L — SIGNIFICANT CHANGE UP (ref 3.5–5.3)
PROT SERPL-MCNC: 6.9 G/DL — SIGNIFICANT CHANGE UP (ref 6–8.3)
RBC # BLD: 4.79 M/UL — SIGNIFICANT CHANGE UP (ref 3.8–5.2)
RBC # FLD: 15.3 % — HIGH (ref 10.3–14.5)
SODIUM SERPL-SCNC: 141 MMOL/L — SIGNIFICANT CHANGE UP (ref 135–145)
T4 FREE+ TSH PNL SERPL: 1.6 UIU/ML — SIGNIFICANT CHANGE UP (ref 0.27–4.2)
WBC # BLD: 6.59 K/UL — SIGNIFICANT CHANGE UP (ref 3.8–10.5)
WBC # FLD AUTO: 6.59 K/UL — SIGNIFICANT CHANGE UP (ref 3.8–10.5)

## 2025-06-24 PROCEDURE — G2211 COMPLEX E/M VISIT ADD ON: CPT | Mod: NC

## 2025-06-24 PROCEDURE — 99214 OFFICE O/P EST MOD 30 MIN: CPT

## 2025-06-25 DIAGNOSIS — C50.919 MALIGNANT NEOPLASM OF UNSPECIFIED SITE OF UNSPECIFIED FEMALE BREAST: ICD-10-CM

## 2025-06-25 DIAGNOSIS — Z51.11 ENCOUNTER FOR ANTINEOPLASTIC CHEMOTHERAPY: ICD-10-CM

## 2025-07-02 ENCOUNTER — RX RENEWAL (OUTPATIENT)
Age: 60
End: 2025-07-02

## 2025-07-17 ENCOUNTER — RESULT REVIEW (OUTPATIENT)
Age: 60
End: 2025-07-17

## 2025-07-17 ENCOUNTER — APPOINTMENT (OUTPATIENT)
Dept: INFUSION THERAPY | Facility: HOSPITAL | Age: 60
End: 2025-07-17

## 2025-07-18 LAB
BASOPHILS # BLD AUTO: 0.04 K/UL — SIGNIFICANT CHANGE UP (ref 0–0.2)
BASOPHILS NFR BLD AUTO: 0.6 % — SIGNIFICANT CHANGE UP (ref 0–2)
CORTIS AM PEAK SERPL-MCNC: 4.2 UG/DL — LOW (ref 6–18.4)
EOSINOPHIL # BLD AUTO: 0.29 K/UL — SIGNIFICANT CHANGE UP (ref 0–0.5)
EOSINOPHIL NFR BLD AUTO: 4.6 % — SIGNIFICANT CHANGE UP (ref 0–6)
HCT VFR BLD CALC: 39.3 % — SIGNIFICANT CHANGE UP (ref 34.5–45)
HGB BLD-MCNC: 11.8 G/DL — SIGNIFICANT CHANGE UP (ref 11.5–15.5)
IMM GRANULOCYTES NFR BLD AUTO: 0.2 % — SIGNIFICANT CHANGE UP (ref 0–0.9)
LYMPHOCYTES # BLD AUTO: 3.09 K/UL — SIGNIFICANT CHANGE UP (ref 1–3.3)
LYMPHOCYTES # BLD AUTO: 48.9 % — HIGH (ref 13–44)
MCHC RBC-ENTMCNC: 24.3 PG — LOW (ref 27–34)
MCHC RBC-ENTMCNC: 30 G/DL — LOW (ref 32–36)
MCV RBC AUTO: 80.9 FL — SIGNIFICANT CHANGE UP (ref 80–100)
MONOCYTES # BLD AUTO: 0.48 K/UL — SIGNIFICANT CHANGE UP (ref 0–0.9)
MONOCYTES NFR BLD AUTO: 7.6 % — SIGNIFICANT CHANGE UP (ref 2–14)
NEUTROPHILS # BLD AUTO: 2.41 K/UL — SIGNIFICANT CHANGE UP (ref 1.8–7.4)
NEUTROPHILS NFR BLD AUTO: 38.1 % — LOW (ref 43–77)
PLATELET # BLD AUTO: 184 K/UL — SIGNIFICANT CHANGE UP (ref 150–400)
RBC # BLD: 4.86 M/UL — SIGNIFICANT CHANGE UP (ref 3.8–5.2)
RBC # FLD: 16.9 % — HIGH (ref 10.3–14.5)
T4 FREE+ TSH PNL SERPL: 1.23 UIU/ML — SIGNIFICANT CHANGE UP (ref 0.27–4.2)
WBC # BLD: 6.32 K/UL — SIGNIFICANT CHANGE UP (ref 3.8–10.5)
WBC # FLD AUTO: 6.32 K/UL — SIGNIFICANT CHANGE UP (ref 3.8–10.5)

## 2025-07-18 RX ORDER — HYDROCORTISONE 5 MG/1
5 TABLET ORAL
Qty: 90 | Refills: 0 | Status: ACTIVE | COMMUNITY
Start: 2025-07-18 | End: 1900-01-01

## 2025-07-19 LAB
ALBUMIN SERPL ELPH-MCNC: 4.2 G/DL — SIGNIFICANT CHANGE UP (ref 3.3–5)
ALP SERPL-CCNC: 95 U/L — SIGNIFICANT CHANGE UP (ref 40–120)
ALT FLD-CCNC: 38 U/L — SIGNIFICANT CHANGE UP (ref 10–40)
ANION GAP SERPL CALC-SCNC: 18 MMOL/L — HIGH (ref 5–17)
AST SERPL-CCNC: 37 U/L — HIGH (ref 10–35)
BILIRUB SERPL-MCNC: 0.7 MG/DL — SIGNIFICANT CHANGE UP (ref 0.2–1.2)
BUN SERPL-MCNC: 15 MG/DL — SIGNIFICANT CHANGE UP (ref 7–23)
CALCIUM SERPL-MCNC: 9.3 MG/DL — SIGNIFICANT CHANGE UP (ref 8.4–10.5)
CHLORIDE SERPL-SCNC: 103 MMOL/L — SIGNIFICANT CHANGE UP (ref 96–108)
CO2 SERPL-SCNC: 20 MMOL/L — LOW (ref 22–31)
CREAT SERPL-MCNC: 1.47 MG/DL — HIGH (ref 0.5–1.3)
EGFR: 40 ML/MIN/1.73M2 — LOW
EGFR: 40 ML/MIN/1.73M2 — LOW
GLUCOSE SERPL-MCNC: 74 MG/DL — SIGNIFICANT CHANGE UP (ref 70–99)
POTASSIUM SERPL-MCNC: 4 MMOL/L — SIGNIFICANT CHANGE UP (ref 3.5–5.3)
POTASSIUM SERPL-SCNC: 4 MMOL/L — SIGNIFICANT CHANGE UP (ref 3.5–5.3)
PROT SERPL-MCNC: 6.7 G/DL — SIGNIFICANT CHANGE UP (ref 6–8.3)
SODIUM SERPL-SCNC: 142 MMOL/L — SIGNIFICANT CHANGE UP (ref 135–145)

## 2025-07-31 ENCOUNTER — RX RENEWAL (OUTPATIENT)
Age: 60
End: 2025-07-31

## 2025-08-04 ENCOUNTER — NON-APPOINTMENT (OUTPATIENT)
Age: 60
End: 2025-08-04

## 2025-08-05 ENCOUNTER — RESULT REVIEW (OUTPATIENT)
Age: 60
End: 2025-08-05

## 2025-08-05 ENCOUNTER — APPOINTMENT (OUTPATIENT)
Dept: HEMATOLOGY ONCOLOGY | Facility: CLINIC | Age: 60
End: 2025-08-05
Payer: MEDICAID

## 2025-08-05 ENCOUNTER — APPOINTMENT (OUTPATIENT)
Dept: INFUSION THERAPY | Facility: HOSPITAL | Age: 60
End: 2025-08-05

## 2025-08-05 VITALS
RESPIRATION RATE: 16 BRPM | SYSTOLIC BLOOD PRESSURE: 116 MMHG | TEMPERATURE: 98.2 F | DIASTOLIC BLOOD PRESSURE: 74 MMHG | WEIGHT: 194 LBS | HEART RATE: 70 BPM | BODY MASS INDEX: 30.16 KG/M2 | OXYGEN SATURATION: 98 %

## 2025-08-05 DIAGNOSIS — Z17.1 MALIGNANT NEOPLASM OF UNSPECIFIED SITE OF RIGHT FEMALE BREAST: ICD-10-CM

## 2025-08-05 DIAGNOSIS — C50.911 MALIGNANT NEOPLASM OF UNSPECIFIED SITE OF RIGHT FEMALE BREAST: ICD-10-CM

## 2025-08-05 DIAGNOSIS — D72.820 LYMPHOCYTOSIS (SYMPTOMATIC): ICD-10-CM

## 2025-08-05 DIAGNOSIS — Z51.12 ENCOUNTER FOR ANTINEOPLASTIC IMMUNOTHERAPY: ICD-10-CM

## 2025-08-05 LAB
ALBUMIN SERPL ELPH-MCNC: 4.1 G/DL — SIGNIFICANT CHANGE UP (ref 3.3–5)
ALP SERPL-CCNC: 89 U/L — SIGNIFICANT CHANGE UP (ref 40–120)
ALT FLD-CCNC: 31 U/L — SIGNIFICANT CHANGE UP (ref 10–45)
ANION GAP SERPL CALC-SCNC: 14 MMOL/L — SIGNIFICANT CHANGE UP (ref 5–17)
AST SERPL-CCNC: 31 U/L — SIGNIFICANT CHANGE UP (ref 10–40)
BASOPHILS # BLD AUTO: 0.05 K/UL — SIGNIFICANT CHANGE UP (ref 0–0.2)
BASOPHILS NFR BLD AUTO: 0.6 % — SIGNIFICANT CHANGE UP (ref 0–2)
BILIRUB SERPL-MCNC: 0.7 MG/DL — SIGNIFICANT CHANGE UP (ref 0.2–1.2)
BUN SERPL-MCNC: 12 MG/DL — SIGNIFICANT CHANGE UP (ref 7–23)
CALCIUM SERPL-MCNC: 9.2 MG/DL — SIGNIFICANT CHANGE UP (ref 8.4–10.5)
CHLORIDE SERPL-SCNC: 104 MMOL/L — SIGNIFICANT CHANGE UP (ref 96–108)
CO2 SERPL-SCNC: 24 MMOL/L — SIGNIFICANT CHANGE UP (ref 22–31)
CREAT SERPL-MCNC: 0.59 MG/DL — SIGNIFICANT CHANGE UP (ref 0.5–1.3)
EGFR: 102 ML/MIN/1.73M2 — SIGNIFICANT CHANGE UP
EGFR: 102 ML/MIN/1.73M2 — SIGNIFICANT CHANGE UP
EOSINOPHIL # BLD AUTO: 0.25 K/UL — SIGNIFICANT CHANGE UP (ref 0–0.5)
EOSINOPHIL NFR BLD AUTO: 3.2 % — SIGNIFICANT CHANGE UP (ref 0–6)
GLUCOSE SERPL-MCNC: 108 MG/DL — HIGH (ref 70–99)
HCT VFR BLD CALC: 39.2 % — SIGNIFICANT CHANGE UP (ref 34.5–45)
HGB BLD-MCNC: 12.1 G/DL — SIGNIFICANT CHANGE UP (ref 11.5–15.5)
IMM GRANULOCYTES NFR BLD AUTO: 0.3 % — SIGNIFICANT CHANGE UP (ref 0–0.9)
LYMPHOCYTES # BLD AUTO: 3.02 K/UL — SIGNIFICANT CHANGE UP (ref 1–3.3)
LYMPHOCYTES # BLD AUTO: 38.1 % — SIGNIFICANT CHANGE UP (ref 13–44)
MCHC RBC-ENTMCNC: 24.4 PG — LOW (ref 27–34)
MCHC RBC-ENTMCNC: 30.9 G/DL — LOW (ref 32–36)
MCV RBC AUTO: 79 FL — LOW (ref 80–100)
MONOCYTES # BLD AUTO: 0.54 K/UL — SIGNIFICANT CHANGE UP (ref 0–0.9)
MONOCYTES NFR BLD AUTO: 6.8 % — SIGNIFICANT CHANGE UP (ref 2–14)
NEUTROPHILS # BLD AUTO: 4.05 K/UL — SIGNIFICANT CHANGE UP (ref 1.8–7.4)
NEUTROPHILS NFR BLD AUTO: 51 % — SIGNIFICANT CHANGE UP (ref 43–77)
NRBC BLD AUTO-RTO: 0 /100 WBCS — SIGNIFICANT CHANGE UP (ref 0–0)
PLATELET # BLD AUTO: 215 K/UL — SIGNIFICANT CHANGE UP (ref 150–400)
POTASSIUM SERPL-MCNC: 3.7 MMOL/L — SIGNIFICANT CHANGE UP (ref 3.5–5.3)
POTASSIUM SERPL-SCNC: 3.7 MMOL/L — SIGNIFICANT CHANGE UP (ref 3.5–5.3)
PROT SERPL-MCNC: 7.1 G/DL — SIGNIFICANT CHANGE UP (ref 6–8.3)
RBC # BLD: 4.96 M/UL — SIGNIFICANT CHANGE UP (ref 3.8–5.2)
RBC # FLD: 16.5 % — HIGH (ref 10.3–14.5)
SODIUM SERPL-SCNC: 142 MMOL/L — SIGNIFICANT CHANGE UP (ref 135–145)
T4 FREE+ TSH PNL SERPL: 0.81 UIU/ML — SIGNIFICANT CHANGE UP (ref 0.27–4.2)
WBC # BLD: 7.93 K/UL — SIGNIFICANT CHANGE UP (ref 3.8–10.5)
WBC # FLD AUTO: 7.93 K/UL — SIGNIFICANT CHANGE UP (ref 3.8–10.5)

## 2025-08-05 PROCEDURE — 99214 OFFICE O/P EST MOD 30 MIN: CPT

## 2025-08-05 PROCEDURE — G2211 COMPLEX E/M VISIT ADD ON: CPT | Mod: NC

## 2025-08-27 ENCOUNTER — APPOINTMENT (OUTPATIENT)
Dept: INFUSION THERAPY | Facility: HOSPITAL | Age: 60
End: 2025-08-27

## 2025-08-27 ENCOUNTER — RESULT REVIEW (OUTPATIENT)
Age: 60
End: 2025-08-27

## 2025-08-27 ENCOUNTER — APPOINTMENT (OUTPATIENT)
Dept: HEMATOLOGY ONCOLOGY | Facility: CLINIC | Age: 60
End: 2025-08-27
Payer: MEDICAID

## 2025-08-27 VITALS
HEART RATE: 78 BPM | DIASTOLIC BLOOD PRESSURE: 82 MMHG | BODY MASS INDEX: 29.99 KG/M2 | WEIGHT: 192.9 LBS | SYSTOLIC BLOOD PRESSURE: 122 MMHG | TEMPERATURE: 97.8 F | RESPIRATION RATE: 16 BRPM | OXYGEN SATURATION: 99 %

## 2025-08-27 DIAGNOSIS — C50.911 MALIGNANT NEOPLASM OF UNSPECIFIED SITE OF RIGHT FEMALE BREAST: ICD-10-CM

## 2025-08-27 DIAGNOSIS — Z51.12 ENCOUNTER FOR ANTINEOPLASTIC IMMUNOTHERAPY: ICD-10-CM

## 2025-08-27 DIAGNOSIS — Z17.1 MALIGNANT NEOPLASM OF UNSPECIFIED SITE OF RIGHT FEMALE BREAST: ICD-10-CM

## 2025-08-27 PROCEDURE — 99214 OFFICE O/P EST MOD 30 MIN: CPT

## 2025-09-16 ENCOUNTER — RESULT REVIEW (OUTPATIENT)
Age: 60
End: 2025-09-16

## 2025-09-16 ENCOUNTER — APPOINTMENT (OUTPATIENT)
Dept: INFUSION THERAPY | Facility: HOSPITAL | Age: 60
End: 2025-09-16

## 2025-09-16 ENCOUNTER — APPOINTMENT (OUTPATIENT)
Dept: HEMATOLOGY ONCOLOGY | Facility: CLINIC | Age: 60
End: 2025-09-16
Payer: MEDICAID

## 2025-09-16 VITALS
WEIGHT: 194 LBS | BODY MASS INDEX: 30.16 KG/M2 | RESPIRATION RATE: 16 BRPM | DIASTOLIC BLOOD PRESSURE: 69 MMHG | HEART RATE: 78 BPM | TEMPERATURE: 96.8 F | OXYGEN SATURATION: 99 % | SYSTOLIC BLOOD PRESSURE: 103 MMHG

## 2025-09-16 DIAGNOSIS — D72.820 LYMPHOCYTOSIS (SYMPTOMATIC): ICD-10-CM

## 2025-09-16 DIAGNOSIS — Z51.12 ENCOUNTER FOR ANTINEOPLASTIC IMMUNOTHERAPY: ICD-10-CM

## 2025-09-16 DIAGNOSIS — Z17.1 MALIGNANT NEOPLASM OF UNSPECIFIED SITE OF RIGHT FEMALE BREAST: ICD-10-CM

## 2025-09-16 DIAGNOSIS — C50.911 MALIGNANT NEOPLASM OF UNSPECIFIED SITE OF RIGHT FEMALE BREAST: ICD-10-CM

## 2025-09-16 PROCEDURE — G2211 COMPLEX E/M VISIT ADD ON: CPT | Mod: NC

## 2025-09-16 PROCEDURE — 99214 OFFICE O/P EST MOD 30 MIN: CPT
